# Patient Record
Sex: FEMALE | Race: BLACK OR AFRICAN AMERICAN | Employment: UNEMPLOYED | URBAN - METROPOLITAN AREA
[De-identification: names, ages, dates, MRNs, and addresses within clinical notes are randomized per-mention and may not be internally consistent; named-entity substitution may affect disease eponyms.]

---

## 2018-07-18 ENCOUNTER — OFFICE VISIT (OUTPATIENT)
Dept: FAMILY MEDICINE CLINIC | Age: 63
End: 2018-07-18

## 2018-07-18 ENCOUNTER — HOSPITAL ENCOUNTER (OUTPATIENT)
Dept: LAB | Age: 63
Discharge: HOME | End: 2018-07-18

## 2018-07-18 VITALS
SYSTOLIC BLOOD PRESSURE: 125 MMHG | WEIGHT: 128 LBS | RESPIRATION RATE: 16 BRPM | HEIGHT: 63 IN | BODY MASS INDEX: 22.68 KG/M2 | HEART RATE: 114 BPM | TEMPERATURE: 98.8 F | OXYGEN SATURATION: 96 % | DIASTOLIC BLOOD PRESSURE: 75 MMHG

## 2018-07-18 DIAGNOSIS — E11.9 TYPE 2 DIABETES MELLITUS WITHOUT COMPLICATION, WITHOUT LONG-TERM CURRENT USE OF INSULIN (HCC): ICD-10-CM

## 2018-07-18 DIAGNOSIS — I10 ESSENTIAL HYPERTENSION: ICD-10-CM

## 2018-07-18 DIAGNOSIS — Z76.89 ENCOUNTER TO ESTABLISH CARE: ICD-10-CM

## 2018-07-18 DIAGNOSIS — Z76.89 ENCOUNTER TO ESTABLISH CARE: Primary | ICD-10-CM

## 2018-07-18 LAB
ALBUMIN SERPL-MCNC: 4.1 G/DL (ref 3.4–5)
ALBUMIN/GLOB SERPL: 1.2 {RATIO} (ref 0.8–1.7)
ALP SERPL-CCNC: 79 U/L (ref 45–117)
ALT SERPL-CCNC: 18 U/L (ref 13–56)
AMPHET UR QL SCN: NEGATIVE
ANION GAP SERPL CALC-SCNC: 7 MMOL/L (ref 3–18)
AST SERPL-CCNC: 12 U/L (ref 15–37)
BARBITURATES UR QL SCN: NEGATIVE
BASOPHILS # BLD: 0 K/UL (ref 0–0.1)
BASOPHILS NFR BLD: 0 % (ref 0–2)
BENZODIAZ UR QL: NEGATIVE
BILIRUB SERPL-MCNC: 0.3 MG/DL (ref 0.2–1)
BUN SERPL-MCNC: 28 MG/DL (ref 7–18)
BUN/CREAT SERPL: 25 (ref 12–20)
CALCIUM SERPL-MCNC: 9.6 MG/DL (ref 8.5–10.1)
CANNABINOIDS UR QL SCN: NEGATIVE
CHLORIDE SERPL-SCNC: 98 MMOL/L (ref 100–108)
CHOLEST SERPL-MCNC: 184 MG/DL
CO2 SERPL-SCNC: 32 MMOL/L (ref 21–32)
COCAINE UR QL SCN: NEGATIVE
CREAT SERPL-MCNC: 1.11 MG/DL (ref 0.6–1.3)
DIFFERENTIAL METHOD BLD: ABNORMAL
EOSINOPHIL # BLD: 0.1 K/UL (ref 0–0.4)
EOSINOPHIL NFR BLD: 1 % (ref 0–5)
ERYTHROCYTE [DISTWIDTH] IN BLOOD BY AUTOMATED COUNT: 11.9 % (ref 11.6–14.5)
EST. AVERAGE GLUCOSE BLD GHB EST-MCNC: 298 MG/DL
GLOBULIN SER CALC-MCNC: 3.5 G/DL (ref 2–4)
GLUCOSE SERPL-MCNC: 197 MG/DL (ref 74–99)
HBA1C MFR BLD: 12 % (ref 4.2–5.6)
HCT VFR BLD AUTO: 41.6 % (ref 35–45)
HDLC SERPL-MCNC: 38 MG/DL (ref 40–60)
HDLC SERPL: 4.8 {RATIO} (ref 0–5)
HDSCOM,HDSCOM: NORMAL
HGB BLD-MCNC: 14 G/DL (ref 12–16)
LDLC SERPL CALC-MCNC: 108.8 MG/DL (ref 0–100)
LIPID PROFILE,FLP: ABNORMAL
LYMPHOCYTES # BLD: 1.8 K/UL (ref 0.9–3.6)
LYMPHOCYTES NFR BLD: 27 % (ref 21–52)
MCH RBC QN AUTO: 28.6 PG (ref 24–34)
MCHC RBC AUTO-ENTMCNC: 33.7 G/DL (ref 31–37)
MCV RBC AUTO: 84.9 FL (ref 74–97)
METHADONE UR QL: NEGATIVE
MONOCYTES # BLD: 0.7 K/UL (ref 0.05–1.2)
MONOCYTES NFR BLD: 11 % (ref 3–10)
NEUTS SEG # BLD: 4 K/UL (ref 1.8–8)
NEUTS SEG NFR BLD: 61 % (ref 40–73)
OPIATES UR QL: NEGATIVE
PCP UR QL: NEGATIVE
PLATELET # BLD AUTO: 320 K/UL (ref 135–420)
PMV BLD AUTO: 10.6 FL (ref 9.2–11.8)
POTASSIUM SERPL-SCNC: 3.7 MMOL/L (ref 3.5–5.5)
PROT SERPL-MCNC: 7.6 G/DL (ref 6.4–8.2)
RBC # BLD AUTO: 4.9 M/UL (ref 4.2–5.3)
SODIUM SERPL-SCNC: 137 MMOL/L (ref 136–145)
T PALLIDUM AB SER QL IA: NONREACTIVE
TRIGL SERPL-MCNC: 186 MG/DL (ref ?–150)
TSH SERPL DL<=0.05 MIU/L-ACNC: 1.92 UIU/ML (ref 0.36–3.74)
VLDLC SERPL CALC-MCNC: 37.2 MG/DL
WBC # BLD AUTO: 6.5 K/UL (ref 4.6–13.2)

## 2018-07-18 PROCEDURE — 80053 COMPREHEN METABOLIC PANEL: CPT | Performed by: NURSE PRACTITIONER

## 2018-07-18 PROCEDURE — 84443 ASSAY THYROID STIM HORMONE: CPT | Performed by: NURSE PRACTITIONER

## 2018-07-18 PROCEDURE — 86780 TREPONEMA PALLIDUM: CPT | Performed by: NURSE PRACTITIONER

## 2018-07-18 PROCEDURE — 80061 LIPID PANEL: CPT | Performed by: NURSE PRACTITIONER

## 2018-07-18 PROCEDURE — 85025 COMPLETE CBC W/AUTO DIFF WBC: CPT | Performed by: NURSE PRACTITIONER

## 2018-07-18 PROCEDURE — 80307 DRUG TEST PRSMV CHEM ANLYZR: CPT | Performed by: NURSE PRACTITIONER

## 2018-07-18 PROCEDURE — 87389 HIV-1 AG W/HIV-1&-2 AB AG IA: CPT | Performed by: NURSE PRACTITIONER

## 2018-07-18 PROCEDURE — 80074 ACUTE HEPATITIS PANEL: CPT | Performed by: NURSE PRACTITIONER

## 2018-07-18 PROCEDURE — 86677 HELICOBACTER PYLORI ANTIBODY: CPT | Performed by: NURSE PRACTITIONER

## 2018-07-18 PROCEDURE — 83036 HEMOGLOBIN GLYCOSYLATED A1C: CPT | Performed by: NURSE PRACTITIONER

## 2018-07-18 RX ORDER — METFORMIN HYDROCHLORIDE 500 MG/1
500 TABLET ORAL 2 TIMES DAILY WITH MEALS
Qty: 60 TAB | Refills: 3 | Status: SHIPPED | OUTPATIENT
Start: 2018-07-18 | End: 2018-08-20 | Stop reason: SDUPTHER

## 2018-07-18 RX ORDER — GUAIFENESIN 100 MG/5ML
81 LIQUID (ML) ORAL DAILY
COMMUNITY

## 2018-07-18 RX ORDER — INDAPAMIDE 2.5 MG/1
TABLET, FILM COATED ORAL DAILY
COMMUNITY
End: 2018-07-18 | Stop reason: SDUPTHER

## 2018-07-18 RX ORDER — SPIRONOLACTONE 25 MG/1
25 TABLET ORAL DAILY
Qty: 30 TAB | Refills: 3 | Status: SHIPPED | OUTPATIENT
Start: 2018-07-18 | End: 2018-10-17

## 2018-07-18 RX ORDER — METFORMIN HYDROCHLORIDE 500 MG/1
TABLET ORAL 2 TIMES DAILY WITH MEALS
COMMUNITY
End: 2018-07-18 | Stop reason: SDUPTHER

## 2018-07-18 RX ORDER — SPIRONOLACTONE 25 MG/1
TABLET ORAL DAILY
COMMUNITY
End: 2018-07-18 | Stop reason: SDUPTHER

## 2018-07-18 RX ORDER — INDAPAMIDE 2.5 MG/1
2.5 TABLET, FILM COATED ORAL DAILY
Qty: 30 TAB | Refills: 3 | Status: SHIPPED | OUTPATIENT
Start: 2018-07-18 | End: 2019-01-23 | Stop reason: SDUPTHER

## 2018-07-18 NOTE — PATIENT INSTRUCTIONS
The [facility] [place] reminders! Foundation Operating Hours: These may change without notice. Mon- Wed 7am to 5pm. Closed for lunch 12-1pm  Thurs 7am to 12pm  Fridays closed     NO SHOW POLICY ~ If a patient has 3 no shows for an appointment with the Provider, Mental Health Provider, or the Nurse Navigator in 6 months, they will be discharged from the practice for 6 months. Medication ordering will also be suspended. If the patient is discharged from the [facility], they can go to the [place] [address] where they can be seen for the primary needs plus obtain the same types of medications as they receive at the [facility]. To avoid being discharged, the patient must call the office at [phone] 24 hours prior to their appointment if they need to cancel, arrive to their appointments on time and come to all scheduled appointments. If the patient is discharged from the [facility], they can apply to be re-established after 12 months. Lab work:  Unless you are instructed differently, please return to the office between the hours of 7 am and 10:30 am Monday through Thursday to have your labs drawn one week before your next scheduled PROVIDER visit. If you do not have an appointment to follow up on these results, please make one or plan to call the office if you do not hear from us to get the results. No news does not mean good news. Medications: If your medications are new or have changed, and you get your medications from the clinic pharmacy (C), you MUST talk to the pharmacy staff to sign the new prescription applications. If you don't sign the applications we cannot get the medications for you. It usually takes 6-8 weeks for your medications to arrive. The Pharmacy staff will call you when your medications are available. You will have 30 days to come in and  your medications.  If you don't  your medicines within those 30 days, those medicines will be placed on the self as samples and you will have to start all over again by completing the applications and waiting the 6-8 weeks for your medicines to arrive. This is firm and there will be no exceptions! ! The [facility] Connection or [facility] will assist you with your medications if available but not all medications are available so some may be obtained through local pharmacies such as [facility] that has a large $4 list and [facility] that has many drugs for free or also for $4.  We will work hard to get the best medications for you that you can also afford. [facility] Medication pick-up times:  Monday-Tuesday 1:00 -5:00 PM  Wednesday- Thursday 8:00 -11:30 AM      Feet Care: Local [facility] through [facility]  Every second Tuesday of the month (except for holidays and election days) from 9am to 1 pm. The services provided by these ministry volunteers are free of charge with the option to donate. They will inspect your feet thoroughly, soak them for 10 minutes, cut and file your nails. They care for diabetics as well. Keep in mind this service is free and will be on a first come first serve basis. Bad teeth? Ask about the Dental Bus to get you in front of a local dentist. The bus leaves every other Wednesday for those on the list. (Ask about availability as these appointments are limited)    Eye exams for Diabetics. Please let us know so we can add you to the list to see the eye doctor at [facility]. You will receive a free eye exam and free glasses if needed. Unfortunately, if you are not a diabetic, we do not have a free service for eye exams for you (yet!). We do have information on where to go to get a huge discount on eye exams and glasses. Sick visits: If you are sick and it is not an emergency call the office to see if you can schedule an appointment.      Charges and cost items from the clinic:  Most of our orders are covered by [address] but there ARE SOME CHARGES for items such as radiology interpretations and anesthesiology during procedures and surgeries. Please make sure you have contacted the Advanced Patient Advocacy (APA) group to check on your payment options: [email or web address]. or come in and talk to them in person: On  Tuesdays, we have Advanced Patient Advocacy is available Mon - Fri 8-4pm at [facility] on the first floor by the information desk. Their number is [phone]. It is important that you are screened in order to qualify for assistance and to avoid huge medical charges. The [facility] is not responsible for ANY charges you may accrue regardless of who ordered the medication, procedure, treatment or test. If you go to the Emergency Room, you WILL be charged! Behavior and emotional issues! It is stressful to be sick, have an illness, take medications, not have a job, not have medical insurance, have family issues or just getting older! Schedule an appointment with our mental health provider. She is in the office Mondays and Wednesdays from 8am to [phone] The [facility] can also contact the following: The national suicide hotline (9-038-788-TVDS or 3-950.237.5979)    [address] [facility], [address] [place] [address]    Walk- in hours Monday -Wednesday   8:30 am -11:15 AM  2:15pm -4:15 pm    [employer] (CSB) - [place]  [address], [address]  [phone]        The [place] Department for Aging and The [facility], in collaboration with community partners, provides and advocates for resources and services to improve the employment, quality of life, security, and independence of older [address], [address] with disabilities, and their families. Department for Aging and Rehabilitative Services  [address] Location: [address]   Voice: [address] [place]: [phone]  E-mail: [email or web address]      Immunizations/Vaccination  Routine Immunizations are provided for infants, children, teens, and adults at the [facility] FOR PHYSICAL REHABILITATION. Please bring all immunization records with you and present them at the time of registration. Phone [phone] extension [phone]  Hours (Walk in)  Monday, Tuesday, Wednesday and Friday  8:00 AM to 11:00 AM  12:30 PM to 3:00 PM    Want to Quit Smoking??? Continued tobacco use increases your risks of elevated blood pressure, vascular irritation with increased incidence of cardiovascular with stroke, heart attack, and/or peripheral vascular disease causing claudication. Smoking may also cause lung damage that could lead to COPD, cancer, and death. We encourage you to find a few healthy habits, write them down then plan to decrease your cigarette use by one each week till they are gone all together. Plan for ways to cope with stress for stress may cause you to want to restart. It is imperative to develop new coping mechanisms in order to be successful. 1-800-QUIT-NOW provided for counseling        Drug and Alcohol Addiction Issues! It is hard to stop a poor habit but there is help out there. Please feel free to attend any other the following support groups to help you kick the habit or go to [facility] to be evaluated by the psychiatric team. Never give up!!     AlAnon meetings: Juan Luis mehta, [place], [place]    [place] MONDAY 7:30 PM JUST FOR TODAY AFG Gerard BARCENAS Pentecostalism [place] [address] [address] [facility]     [facility] WEDNESDAY 10:30 AM NEW BEGINNINGS [employer] Gerard Parrott ASSEMBLY OF [facility], ROOM [address] [place] [address] WEDNESDAY 8:00  Kelvin aZpata [address] [facility] [address] 8:00  W [facility] [address] [facility] 8:00 PM LET IT BEGIN WITH ME AFG Gerard CAMPBELL PentecostalismKARIN Mendez 17 6;30 PM [facility] PARENTS AFG Parents [place] Pentecostalism [facility] [facility]      [place] MONDAY 10:30 AM LIFELINE AFG Gerard [facility] 96 [facility] SATURDAY 8:00 PM [facility] SATURDAY NIGHT AFG Gerard [facility] 96 [facility] MONDAY 7:00 PM MONDAY NIGHT AFG Al-Hayley ZHANG [facility] 1589 [facility] WEDNESDAY 8:00 PM SERENITY SEEKERS AFG Al-Hayley [facility] 202 [facility]

## 2018-07-18 NOTE — MR AVS SNAPSHOT
[address] [facility] [address] 
[phone] Patient: Yrn Whatley MRN: [id number] :1955 Visit Information Date & Time Provider Department Dept. Phone Encounter #  
 2018  1:30 PM Roro Davenport NP  [facility] [id number] Your Appointments 2018 10:00 AM  
CONSULT with NURSE NAVIGATOR [address] [place] [address] ([facility]) Appt Note: NPO/LAB REVIEW 1 hour [address] [facility] [phone]  
129 [facility] [address]  
  
    
 10/10/2018  9:00 AM  
PAP/PELVIC with Roro Davenport NP [address] [place] [address] ([facility]) Appt Note: Pap w/labs [address] [facility] [phone]  
129 [facility] [address]  
  
    
 10/17/2018 11:00 AM  
Follow Up with Roro Davenport NP [address] [place] [address] ([facility]) Appt Note: 3 mth follow up  
 [address] [facility] [id number]  
1222 [facility] [address] Upcoming Health Maintenance Date Due Hepatitis C Screening 1955 DTaP/Tdap/Td series (1 - Tdap) 10/8/1975 FOBT Q 1 YEAR AGE 50-75 10/8/2004 ZOSTER VACCINE AGE 60> 2014 PAP AKA CERVICAL CYTOLOGY 2015 Influenza Age 5 to Adult 2018 BREAST CANCER SCRN MAMMOGRAM 2020 Allergies as of 2018  Review Complete On: 2018 By: Roro Davenport NP Severity Noted Reaction Type Reactions Claritin [Loratadine]  2018    Rash Lisinopril  2018    Rash Vistaril [Hydroxyzine Pamoate]  2018    Rash Current Immunizations  Never Reviewed No immunizations on file. Not reviewed this visit You Were Diagnosed With   
  
 Codes Comments Encounter to establish care    -  Primary ICD-10-CM: Z76.89 
ICD-9-CM: V65.8 Essential hypertension     ICD-10-CM: I10 
ICD-9-CM: 401.9 Type 2 diabetes mellitus without complication, without long-term current use of insulin (HCC)     ICD-10-CM: E11.9 ICD-9-CM: 250.00 Vitals BP Pulse Temp Resp Height(growth percentile) Weight(growth percentile) 125/75 (BP 1 Location: Left arm, BP Patient Position: Sitting) (!) 114 98.8 °F (37.1 °C) (Oral) 16 5' 2.5\" (1.588 m) 128 lb (58.1 kg) LMP SpO2 BMI OB Status Smoking Status 01/01/1980 96% 23.04 kg/m2 Hysterectomy Former Smoker Vitals History BMI and BSA Data Body Mass Index Body Surface Area 23.04 kg/m 2 1.6 m 2 Preferred Pharmacy Pharmacy Name Phone Jaime Brown [address] [place] [address]. [phone] Your Updated Medication List  
  
   
This list is accurate as of 7/18/18  2:07 PM.  Always use your most recent med list.  
  
  
  
  
 ALMAZ CHEWABLE ASPIRIN 81 mg chewable tablet Generic drug:  aspirin Take 81 mg by mouth daily. HYDROcodone-acetaminophen 5-500 mg per tablet Commonly known as:  Sueepaulino Lymanple Take 1 Tab by mouth every eight (8) hours as needed (BREAKTHROUGH PAIN). indapamide 2.5 mg tablet Commonly known as:  Leta Alar Take 1 Tab by mouth daily. metFORMIN 500 mg tablet Commonly known as:  GLUCOPHAGE Take 1 Tab by mouth two (2) times daily (with meals). Indications: type 2 diabetes mellitus  
  
 spironolactone 25 mg tablet Commonly known as:  ALDACTONE Take 1 Tab by mouth daily. Prescriptions Sent to Pharmacy Refills  
 spironolactone (ALDACTONE) 25 mg tablet 3 Sig: Take 1 Tab by mouth daily. Class: Normal  
 Pharmacy: [facility] [address] [place] [address]. Ph #: [phone]  Route: Oral  
 indapamide (LOZOL) 2.5 mg tablet 3  
 Sig: Take 1 Tab by mouth daily. Class: Normal  
 Pharmacy: [facility] [address]. Ph #: [phone] Route: Oral  
 metFORMIN (GLUCOPHAGE) 500 mg tablet 3 Sig: Take 1 Tab by mouth two (2) times daily (with meals). Indications: type 2 diabetes mellitus Class: Normal  
 Pharmacy: [facility] [address]. Ph #: [phone] Route: Oral  
  
We Performed the Following REFERRAL TO OPTOMETRY [id number] Custom] Comments:  
 [place] [address] Program  
  
Referral Information Referral ID Referred By Referred To  
  
 [id number] Reinier LINDQUIST Not Available Visits Status Start Date End Date 1 New Request 7/18/18 7/18/19 If your referral has a status of pending review or denied, additional information will be sent to support the outcome of this decision. Patient Instructions The [facility] [place] reminders! Foundation Operating Hours: These may change without notice. Mon- Wed 7am to 5pm. Closed for lunch 12-1pm 
Thurs 7am to 12pm 
Fridays closed NO SHOW POLICY ~ If a patient has 3 no shows for an appointment with the Provider, Mental Health Provider, or the Nurse Navigator in 6 months, they will be discharged from the practice for 6 months. Medication ordering will also be suspended. If the patient is discharged from the [facility], they can go to the [place] [address] where they can be seen for the primary needs plus obtain the same types of medications as they receive at the [facility]. To avoid being discharged, the patient must call the office at [phone] 24 hours prior to their appointment if they need to cancel, arrive to their appointments on time and come to all scheduled appointments. If the patient is discharged from the practice, they can apply to be re-established after 12 months.   
 
Lab work:  Unless you are instructed differently, please return to the office between the hours of 7 am and 10:30 am Monday through Thursday to have your labs drawn one week before your next scheduled PROVIDER visit. If you do not have an appointment to follow up on these results, please make one or plan to call the office if you do not hear from us to get the results. No news does not mean good news. Medications: If your medications are new or have changed, and you get your medications from the clinic pharmacy ([facility]), you MUST talk to the pharmacy staff to sign the new prescription applications. If you don't sign the applications we cannot get the medications for you. It usually takes 6-8 weeks for your medications to arrive. The Pharmacy staff will call you when your medications are available. You will have 30 days to come in and  your medications. If you don't  your medicines within those 30 days, those medicines will be placed on the self as samples and you will have to start all over again by completing the applications and waiting the 6-8 weeks for your medicines to arrive. This is firm and there will be no exceptions! ! The [facility] Connection or [facility] will assist you with your medications if available but not all medications are available so some may be obtained through local pharmacies such as [facility] that has a large $4 list and [facility] that has many drugs for free or also for $4.  We will work hard to get the best medications for you that you can also afford. [facility] Medication pick-up times: 
Monday-Tuesday 1:00 -5:00 PM 
Wednesday- Thursday 8:00 -11:30 AM 
 
 
Feet Care: Local ministry through [facility] Every second Tuesday of the month (except for holidays and election days) from 9am to 1 pm. The services provided by these ministry volunteers are free of charge with the option to donate. They will inspect your feet thoroughly, soak them for 10 minutes, cut and file your nails.  They care for diabetics as well. Keep in mind this service is free and will be on a first come first serve basis. Bad teeth? Ask about the Dental Bus to get you in front of a local dentist. The bus leaves every other Wednesday for those on the list. (Ask about availability as these appointments are limited) Eye exams for Diabetics. Please let us know so we can add you to the list to see the eye doctor at [facility]. You will receive a free eye exam and free glasses if needed. Unfortunately, if you are not a diabetic, we do not have a free service for eye exams for you (yet!). We do have information on where to go to get a huge discount on eye exams and glasses. Sick visits: If you are sick and it is not an emergency call the office to see if you can schedule an appointment. Charges and cost items from the clinic:  Most of our orders are covered by [facility] but there ARE SOME CHARGES for items such as radiology interpretations and anesthesiology during procedures and surgeries. Please make sure you have contacted the Advanced Patient Advocacy (APA) group to check on your payment options: [email or web address]. or come in and talk to them in person: On 
Tuesdays, we have Advanced Patient Advocacy is available Mon - Fri 8-4pm at [facility] on the first floor by the information desk. Their number is [phone]. It is important that you are screened in order to qualify for assistance and to avoid huge medical charges. The [facility] is not responsible for ANY charges you may accrue regardless of who ordered the medication, procedure, treatment or test. If you go to the Emergency Room, you WILL be charged! Behavior and emotional issues! It is stressful to be sick, have an illness, take medications, not have a job, not have medical insurance, have family issues or just getting older! Schedule an appointment with our mental health provider.  She is in the office Mondays and Wednesdays from 8am to 4pm.   
 
You can also contact the following: The national suicide hotline (4-500-610-RHOC or 8-262.414.7383) [facility] [address] [facility], 85 [facility] Walk- in hours Monday -Wednesday 8:30 am -11:15 AM 
2:15pm -4:15 pm 
 
[employer] (CSB) - [place] 14[address] [facility], [address] 
[phone] The [address] [facility] for Aging and The [facility], in collaboration with community partners, provides and advocates for resources and services to improve the employment, quality of life, security, and independence of older [address], [address] with disabilities, and their families. [facility] Street Location: [address] [facility] [place] Voice: [phone] Toll Free Number:[phone] Toll Free TTY: [phone] E-mail: [email or web address]. [email or web address] Immunizations/Vaccination Routine Immunizations are provided for infants, children, teens, and adults at the [facility] FOR PHYSICAL REHABILITATION. Please bring all immunization records with you and present them at the time of registration. Phone [phone] Hours (Walk in) Monday, Tuesday, Wednesday and Friday 8:00 AM to 11:00 AM 
12:30 PM to 3:00 PM 
 
Want to Quit Smoking??? Continued tobacco use increases your risks of elevated blood pressure, vascular irritation with increased incidence of cardiovascular with stroke, heart attack, and/or peripheral vascular disease causing claudication. Smoking may also cause lung damage that could lead to COPD, cancer, and death. We encourage you to find a few healthy habits, write them down then plan to decrease your cigarette use by one each week till they are gone all together. Plan for ways to cope with stress for stress may cause you to want to restart. It is imperative to develop new coping mechanisms in order to be successful. 1-800-QUIT-NOW provided for counseling Drug and Alcohol Addiction Issues! It is hard to stop a poor habit but there is help out there. Please feel free to attend any other the following support groups to help you kick the habit or go to [facility] to be evaluated by the psychiatric team. Never give up!! Michaela meetings: Jemma Hoyt, Rashawn wadsworth [facility] MONDAY 7:30 PM JUST FOR TODAY AFG Al-Anon [facility] [address] [facility] WEDNESDAY 10:30 AM NEW BEGINNINGS AFG Al-Anon [place] ASSEMBLY OF [facility], ROOM 201 [address] [facility]  
 
[facility] WEDNESDAY 8:00  Kelvin Meeks Timmy [address] [facility] StreetSA[facility] Hospital THURSDAY 8:00 PM KEEP IT SIMPLE AFG Al-Anon [facility] [address] 8:00 PM LET IT BEGIN WITH ME AFG Al-Anon [facility] 43[address] [facility] FRIDAY 6;30 PM [place] PARENTS AFG Parents [facility]  [place] MONDAY 10:30  [address] [place] [address] [place] SATURDAY 8:00 PM [facility] SATURDAY NIGHT AFG Al-Anon [place] [address] [place] Onia [place] MONDAY 7:00 PM MONDAY NIGHT AFG Al-Anon ZHANG [facility] [address]  
 
[place] WEDNESDAY 8:00 PM SERENITY SEEKERS AFG Al-Anon [facility] [address] [facility]! [facility] introduces Sohalo patient portal. Now you can access parts of your medical record, email your doctor's office, and request medication refills online. 1. In your internet browser, go to [email or web address] 2. Click on the First Time User? Click Here link in the Sign In box. You will see the New Member Sign Up page. 3. Enter your Sohalo Access Code exactly as it appears below.  You will not need to use this code after youve completed the sign-up process. If you do not sign up before the expiration date, you must request a new code. · [employer] Access Code: [email or web address] Expires: 10/16/2018  2:07 PM 
 
4. Enter the last four digits of your Social Security Number (xxxx) and Date of Birth (mm/dd/yyyy) as indicated and click Submit. You will be taken to the next sign-up page. 5. Create a [employer] ID. This will be your [employer] login ID and cannot be changed, so think of one that is secure and easy to remember. 6. Create a [employer] password. You can change your password at any time. 7. Enter your Password Reset Question and Answer. This can be used at a later time if you forget your password. 8. Enter your e-mail address. You will receive e-mail notification when new information is available in [address] 9. Click Sign Up. You can now view and download portions of your medical record. 10. Click the Download Summary menu link to download a portable copy of your medical information. If you have questions, please visit the Frequently Asked Questions section of the [employer] website. Remember, [employer] is NOT to be used for urgent needs. For medical emergencies, dial 911. Now available from your iPhone and Android! Please provide this summary of care documentation to your next provider. Your primary care clinician is listed as Migue Lange. If you have any questions after today's visit, please call [phone].

## 2018-07-18 NOTE — PROGRESS NOTES
[facility]  [id number] [facility] [address] [facility], [address]  [phone] office/[phone] fax      7/18/2018    Reason for visit:   Chief Complaint   Patient presents with   [address] [place] [address] Establish Care    Medication Refill    Hypertension    Gestational Diabetes       Patient: Ledy Kruse, 1955, [id number]       Primary MD: Vinny Escobedo NP    Subjective: Ledy Kruse, a 60 y.o. female, who is here to establish care and management for chronic conditions. HPI  Diabetes/HTN  Pt with longstanding History of DM and HTN She is currently maintained on Current medications for 10 + years. BP today is at goal today <130/80. Patients risk factors include: Uninsured and limited income  Home Blood Sugars are in the following range:  Not performed   . Denies hypoglycemic episodes  Recent hospitalizations: No   Medications regimen is as follows: Metformin, Lozol, and Spirinolactone  Last HgbA1C:  Unknown   Daily exercise and diet are as follows: Pt walks frequently, not following a routine DM diet, she has not had any formal Dm education. Denies Weight gain or loss   Takes the below meds regularly with no side effects. Taking daily ASA 81 mg. Patient is not on a statin  Last LDL: unknown  Last DM eye exam: unknown  Last DM foot exam: unknown   Last urine microalbumin: Unknown   She is not on an Ace /Arb: she is allergic to Lisinopril.          PHQ over the last two weeks 7/18/2018   Little interest or pleasure in doing things Not at all   Feeling down, depressed, irritable, or hopeless Not at all   Total Score PHQ 2 0       Past Medical History:   Diagnosis Date    Arthritis     Diabetes ([address]     Hypertension        Past Surgical History:   Procedure Laterality Date    HX HYSTERECTOMY  1980    HX TONSILLECTOMY      as a child       Social History     Social History    Marital status:      Spouse name: N/A    Number of children: 1    Years of education: 15 Occupational History    Umemployed      Social History Main Topics    Smoking status: Former Smoker    Smokeless tobacco: Never Used    Alcohol use Yes      Comment: occasionally on holidays    Drug use: No    Sexual activity: Yes     Partners: Male     Birth control/ protection: Condom     Other Topics Concern     Service No    Blood Transfusions No    Caffeine Concern No    Occupational Exposure No    Hobby Hazards No    Sleep Concern Yes    Stress Concern No    Weight Concern No     is losing weight    Special Diet No    Back Care No    Exercise Yes     walks    Bike Helmet No    Seat Belt Yes    Self-Exams Yes     Social History Narrative    Patient lives alone, no pets. Allergies   Allergen Reactions    Claritin [Loratadine] Rash    Lisinopril Rash    Vistaril [Hydroxyzine Pamoate] Rash       Current Outpatient Prescriptions on File Prior to Visit   Medication Sig Dispense Refill    HYDROcodone-acetaminophen (VICODIN) 5-500 mg per tablet Take 1 Tab by mouth every eight (8) hours as needed (BREAKTHROUGH PAIN). 6 Tab 0     No current facility-administered medications on file prior to visit. Review of Systems   Constitutional: Negative. HENT: Negative. Eyes: Positive for blurred vision. Respiratory: Negative. Cardiovascular: Negative. Gastrointestinal: Negative. Genitourinary: Negative. Musculoskeletal: Negative. Skin: Negative. Neurological: Positive for tingling (Minimal in feet). Endo/Heme/Allergies: Negative. Psychiatric/Behavioral: Negative.         Objective:   Visit Vitals    /75 (BP 1 Location: Left arm, BP Patient Position: Sitting)    Pulse (!) 114    Temp 98.8 °F (37.1 °C) (Oral)    Resp 16    Ht 5' 2.5\" (1.588 m)    Wt 128 lb (58.1 kg)    LMP 01/01/1980    SpO2 96%    BMI 23.04 kg/m2      Wt Readings from Last 3 Encounters:   07/18/18 128 lb (58.1 kg)     Lab Results   Component Value Date/Time    Glucose 104 (H) 09/17/2013 11:48 AM       Physical Exam   Constitutional: She is oriented to person, place, and time. She appears well-developed and well-nourished. HENT:   Head: Normocephalic. Eyes: Pupils are equal, round, and reactive to light. Neck: Normal range of motion. Neck supple. No JVD present. Carotid bruit is not present. Cardiovascular: Normal rate, regular rhythm, normal heart sounds and intact distal pulses. No murmur heard. Pulmonary/Chest: Effort normal and breath sounds normal. No respiratory distress. Abdominal: Soft. Bowel sounds are normal.   Musculoskeletal: Normal range of motion. Neurological: She is alert and oriented to person, place, and time. She has normal reflexes. Skin: Skin is warm and dry. Psychiatric: She has a normal mood and affect. Her behavior is normal.   Vitals reviewed. Assessment:    Waldo Led who has risk factors including (see above previous medical hx) and:       ICD-10-CM ICD-9-CM    1. Encounter to establish care Z76.89 V65.8 TSH 3RD GENERATION      DRUG SCREEN, URINE      LIPID PANEL      HEPATITIS PANEL, ACUTE      CBC WITH AUTOMATED DIFF      METABOLIC PANEL, COMPREHENSIVE      HIV 1/2 AG/AB, 4TH GENERATION,W RFLX CONFIRM      HEMOGLOBIN A1C WITH EAG      T PALLIDUM AB      H PYLORI AB, IGG, QT   2. Essential hypertension I10 401.9 spironolactone (ALDACTONE) 25 mg tablet      indapamide (LOZOL) 2.5 mg tablet   3. Type 2 diabetes mellitus without complication, without long-term current use of insulin (HCC) E11.9 250.00 metFORMIN (GLUCOPHAGE) 500 mg tablet      REFERRAL TO OPTOMETRY      MICROALBUMIN, UR, RAND W/ MICROALB/CREAT RATIO       DIABETES FOOT EXAM     - Pt meds continue per previous provider as she has been on current meds for years. Confirmed concomitant use with Pharmacy as well, we will get baseline labs to evaluate.    - Of concern the patient is on two diuretics, if kidney labs not stable will consider switching one to an ARB or CCB as she is allergic to an Ace. Plan:   Reviewed medication and completed the medication reconciliation with the patient. Reviewed side effects of medications with the patient. Questions were answered and patient verb understanding. Pt is a 59 yo AA female. See Med  for details. Pt in the office today to establish care, medication reconciliation . Labs obtained to establish baseline, evaluate metabolic health, nutritional status, vitamin deficiencies and screening for at risk items based on the demographics of the patient, previous medical history and current social practices.  Will contact the patient in when all labs are resulted by phone to review and make lifestyle and medication recommendations. Follow up labs will be completed to monitor improvement prior to their next visit.       Orders Placed This Encounter    TSH 3RD GENERATION     Standing Status:   Future     Standing Expiration Date:   1/17/2019    DRUG SCREEN, URINE     Standing Status:   Future     Standing Expiration Date:   1/17/2019    LIPID PANEL     Standing Status:   Future     Standing Expiration Date:   7/19/2019    HEPATITIS PANEL, ACUTE     Standing Status:   Future     Standing Expiration Date:   1/17/2019    CBC WITH AUTOMATED DIFF     Standing Status:   Future     Standing Expiration Date:   23/91/0685    METABOLIC PANEL, COMPREHENSIVE     Standing Status:   Future     Standing Expiration Date:   1/17/2019    HIV 1/2 AG/AB, 4TH GENERATION,W RFLX CONFIRM     Standing Status:   Future     Standing Expiration Date:   1/17/2019    HEMOGLOBIN A1C WITH EAG     Standing Status:   Future     Standing Expiration Date:   7/19/2019    T PALLIDUM AB     Standing Status:   Future     Standing Expiration Date:   1/18/2019    H PYLORI AB, IGG, QT     Standing Status:   Future     Standing Expiration Date:   1/17/2019    MICROALBUMIN, UR, RAND W/ MICROALB/CREAT RATIO     Standing Status:   Future     Standing Expiration Date: 1/18/2019    REFERRAL TO OPTOMETRY     Referral Priority:   Routine     Referral Type:   Consultation     Referral Reason:   Specialty Services Required    HM DIABETES FOOT EXAM    aspirin (ALMAZ CHEWABLE ASPIRIN) 81 mg chewable tablet     Sig: Take 81 mg by mouth daily.  DISCONTD: metFORMIN (GLUCOPHAGE) 500 mg tablet     Sig: Take  by mouth two (2) times daily (with meals).  DISCONTD: spironolactone (ALDACTONE) 25 mg tablet     Sig: Take  by mouth daily.  DISCONTD: indapamide (LOZOL) 2.5 mg tablet     Sig: Take  by mouth daily.  spironolactone (ALDACTONE) 25 mg tablet     Sig: Take 1 Tab by mouth daily. Dispense:  30 Tab     Refill:  3    indapamide (LOZOL) 2.5 mg tablet     Sig: Take 1 Tab by mouth daily. Dispense:  30 Tab     Refill:  3    metFORMIN (GLUCOPHAGE) 500 mg tablet     Sig: Take 1 Tab by mouth two (2) times daily (with meals). Indications: type 2 diabetes mellitus     Dispense:  60 Tab     Refill:  3     Current Outpatient Prescriptions   Medication Sig Dispense Refill    aspirin (ALMAZ CHEWABLE ASPIRIN) 81 mg chewable tablet Take 81 mg by mouth daily.  spironolactone (ALDACTONE) 25 mg tablet Take 1 Tab by mouth daily. 30 Tab 3    indapamide (LOZOL) 2.5 mg tablet Take 1 Tab by mouth daily. 30 Tab 3    metFORMIN (GLUCOPHAGE) 500 mg tablet Take 1 Tab by mouth two (2) times daily (with meals). Indications: type 2 diabetes mellitus 60 Tab 3    HYDROcodone-acetaminophen (VICODIN) 5-500 mg per tablet Take 1 Tab by mouth every eight (8) hours as needed (BREAKTHROUGH PAIN). 6 Tab 0     Medications Discontinued During This Encounter   Medication Reason    spironolactone (ALDACTONE) 25 mg tablet Reorder    indapamide (LOZOL) 2.5 mg tablet Reorder    metFORMIN (GLUCOPHAGE) 500 mg tablet Reorder       Follow-up Disposition:  Return in about 2 weeks (around 8/1/2018), or if symptoms worsen or fail to improve, for NPO and Lab review.   See [facility] for financial assistance  Labs needed for follow-up appt      Krys Galan, MSN, RN, FNP-C     [facility]    I spent 25 minutes with the patient in face-to-face consultation, of which greater than 50% was spent in counseling and coordination of care as described above.

## 2018-07-19 DIAGNOSIS — E11.9 TYPE 2 DIABETES MELLITUS WITHOUT COMPLICATION, WITHOUT LONG-TERM CURRENT USE OF INSULIN (HCC): Primary | ICD-10-CM

## 2018-07-19 LAB
HAV IGM SER QL: NEGATIVE
HBV CORE IGM SER QL: NEGATIVE
HBV SURFACE AG SER QL: <0.1 INDEX
HBV SURFACE AG SER QL: NEGATIVE
HCV AB SER IA-ACNC: 0.16 INDEX
HCV AB SERPL QL IA: NEGATIVE
HCV COMMENT,HCGAC: NORMAL
HIV 1+2 AB+HIV1 P24 AG SERPL QL IA: NONREACTIVE
HIV12 RESULT COMMENT, HHIVC: NORMAL
SP1: NORMAL
SP2: NORMAL
SP3: NORMAL

## 2018-07-19 RX ORDER — INSULIN GLARGINE 100 [IU]/ML
10 INJECTION, SOLUTION SUBCUTANEOUS
Qty: 5 ADJUSTABLE DOSE PRE-FILLED PEN SYRINGE | Refills: 3 | Status: SHIPPED | COMMUNITY
Start: 2018-07-19 | End: 2018-10-17

## 2018-07-19 RX ORDER — PEN NEEDLE, DIABETIC 30 GX3/16"
NEEDLE, DISPOSABLE MISCELLANEOUS
Qty: 1 PACKAGE | Refills: 11 | Status: SHIPPED | OUTPATIENT
Start: 2018-08-01 | End: 2019-05-22

## 2018-07-19 NOTE — PROGRESS NOTES
Labs reviewed on new patient. Please discuss with patient via phone or appt 8/1  1. A1C 12, metformin not sufficient enough to treat to goal. Will start on low dose lantus daily. The patient is to sign raad with [facility] and  pen needles from [address]

## 2018-07-20 LAB — H PYLORI IGG SER IA-ACNC: <0.8 INDEX VALUE (ref 0–0.79)

## 2018-08-20 ENCOUNTER — OFFICE VISIT (OUTPATIENT)
Dept: FAMILY MEDICINE CLINIC | Age: 63
End: 2018-08-20

## 2018-08-20 DIAGNOSIS — E11.9 TYPE 2 DIABETES MELLITUS WITHOUT COMPLICATION, WITHOUT LONG-TERM CURRENT USE OF INSULIN (HCC): ICD-10-CM

## 2018-08-20 RX ORDER — GLIPIZIDE 5 MG/1
5 TABLET ORAL
Qty: 90 TAB | Refills: 3 | Status: SHIPPED | OUTPATIENT
Start: 2018-08-20 | End: 2018-12-14 | Stop reason: SDUPTHER

## 2018-08-20 RX ORDER — METFORMIN HYDROCHLORIDE 500 MG/1
500 TABLET ORAL
Qty: 60 TAB | Refills: 3 | Status: SHIPPED | OUTPATIENT
Start: 2018-08-20 | End: 2018-11-05 | Stop reason: SDUPTHER

## 2018-08-20 NOTE — PROGRESS NOTES
Patient does not wish to go on insulin at this time. Will start Metformin TID and add Glipizide TID.

## 2018-08-20 NOTE — PROGRESS NOTES
Fredis Jiménez is a 60 y.o. female is here for her initial visit with the Nurse Navigator for orientation appointment to learn on how the [facility] works and the services we can provide. We have reviewed [facility]:   Hours of operation and number to contact us. Inclement weather policy     No Show Policy     IF YOU ARE TAKING MEDICINE FOR HIGH BLOOD PRESSURE~ PLEASE TAKE  YOUR 2 HOURS PRIOR TO ANY OFFICE VISIT TO SEE YOUR PROVIDER OR THE   NURSES. ~~~PLEASE BRING ALL MEDICATIONS YOU ARE TAKING TO YOUR VISIT WITH YOUR PROVIDER OR NURSE NAVIGATOR~~~     Lab work (Hours to have lab work drawn). She was given the dates to RTO for lab draw prior to next appt w/ Provider. Medication Policies including times to  med's, number to dial to reach your pharmacy technician and the paperwork that must be signed to obtain med's. Foot care thru the [facility] foot ministry hours as well as directions     Dental Clinic      Diabetic eye exams     Sick visits     [facility] Program including location at [address] [place] [address] and phone contact number. Enc to contact Med Assist today! PLEASE NOTE THE [facility] CONTACT IS AT THE [facility] TUESDAY MORNINGS FROM 8 AM-1130 AM.YOU MUST SEE [facility] BEFORE BEING REFERRED TO A SPECIALIST! Mental Health appointments with Ms. Sary Leary are scheduled on Mondays and Wednesdays. Suicide Hotline Number and how to contact AA/NA meetings for help with addictions      We have reviewed Ms. Pope lab work today. Ms. Vicenta Altamirano A1C is 12 and does not want to start insulin therapy. She requests I ask Ms. JACKSON if she can take a \"longer acting\" pill. Ms. JACKSON consulted and agrees to start pt on Glipizide and Metformin TID each. She agrees to come in for one hour diet class next week.

## 2018-09-04 ENCOUNTER — OFFICE VISIT (OUTPATIENT)
Dept: FAMILY MEDICINE CLINIC | Age: 63
End: 2018-09-04

## 2018-09-04 DIAGNOSIS — Z71.89 DIABETES EDUCATION, ENCOUNTER FOR: Primary | ICD-10-CM

## 2018-09-04 NOTE — PROGRESS NOTES
Annmarie Galeazzi is a 60 y.o. female is here for NN visit to review Week one and discuss week 2 of the [facility] Diabetic Pathway. Ms. Jessica Del Valle brought  the last 2 weeks meal logs in for review. There are no documented BG readings. Patient stated she was having problems with the meter and getting \"error\" frequently. She demonstrates correct use of the meter and her BG this am is 156. She was provided with a new meter and strips as well as lancing device. Annmarie Galeazzi was started on a 2200 calorie ADA diet by verbal order Ms. Nell Collazo NP. We have reviewed the S/S of hyper/hypoglycemia, the food pyramid, Food choices for meal planning , Food choices to AVOID when planning meals and the measuring and estimating of portions. The patient is strongly encouraged to measure their food choices for the first couple of times so they know correct serving sizes. Ms. Jessica Del Valle is asked to complete the next weeks meal log again, this time using the tools taught today. Time was provided for discussion and any questions. Ms. Jessica Del Valle is encouraged to call this nurse for any questions/concerns and/or write down any questions so we can review on the next visit.

## 2018-09-10 ENCOUNTER — OFFICE VISIT (OUTPATIENT)
Dept: FAMILY MEDICINE CLINIC | Age: 63
End: 2018-09-10

## 2018-09-10 DIAGNOSIS — Z71.89 DIABETES EDUCATION, ENCOUNTER FOR: Primary | ICD-10-CM

## 2018-09-10 NOTE — PROGRESS NOTES
Dominique Monk is a 60 y.o. female here for Week 3 of the [facility] Diabetic Pathway teaching module. Time is provided at the beginning of the session to review last weeks lesson on diet, exchanges, and measuring portions. The meal log was presented for review and will be scanned into record. Ms. Gilbert Estrada was having a problem using her meter today and came in one day early. Meter problem addressed and pt aware what to do in future when she encounters same problem. We have reviewed her meal log and questions about what ADA servings are addressed. Today's session included education of Ms. Quinones's medications. She is taking 10 units of lantus insulin each pm. She is reminded to eat an HS snack. We have reviewed the type on insulin, onset and peak times. Ms. Gilbert Estrada had 2 episodes of BG readings in the 60's ( 64 and 67 before dinner). Ms. Gilbert Estrada made sure to have piece of candy and then have her meal.    The exercise module was reviewed at length. We have discussed low impact exercises and the importance of starting slow and working up to 30 minutes 4 times weekly. Do something daily! I have also strongly stressed no exercise if blood glucose > 240 mg/dcl. due to possible vision changes. The importance of staying hydrated discussed. Pt is aware of the need to drink 8-10 glasses of water every day. Tea and soda is not counted. Time has been provided for questions and patient is encouraged to call for any questions/concerns.

## 2018-09-17 ENCOUNTER — OFFICE VISIT (OUTPATIENT)
Dept: FAMILY MEDICINE CLINIC | Age: 63
End: 2018-09-17

## 2018-09-17 VITALS
TEMPERATURE: 98.5 F | SYSTOLIC BLOOD PRESSURE: 109 MMHG | RESPIRATION RATE: 12 BRPM | HEART RATE: 114 BPM | OXYGEN SATURATION: 98 % | DIASTOLIC BLOOD PRESSURE: 69 MMHG

## 2018-09-17 DIAGNOSIS — R00.0 TACHYCARDIA WITH HEART RATE 100-120 BEATS PER MINUTE: ICD-10-CM

## 2018-09-17 DIAGNOSIS — Z71.89 DIABETES EDUCATION, ENCOUNTER FOR: Primary | ICD-10-CM

## 2018-09-17 NOTE — PROGRESS NOTES
Sid Carver is a 60 y.o. female is here for week 4 of the [facility] diabetic pathway teaching module. MsShannon Banerjee did not keep a meal log but did bring in meter for review. Her 14 day average reading is 99 with FBS ranging from 66 to 129. \"I did get shaky once and I made sure to eat right away, then I felt better\". We have reviewed the disease process and the how and why long term complications occur. Hyper/hypoglycemia  S/S, treatment, prevention, interventions and goals discussed as were MI, CVA, renal and vascular disease. We have reviewed when to call 911 in event of TIA,CVA and MI. Patient taught time is brain/heart muscle and early treatment starting with EMS will save heart muscle and brain function when treated early. BE FAST taught with dire importance of noting onset of symptoms and reporting to closest ED within three hours. Diabetic complications were discussed, including:  Retinopathy: This is a condition where problems with the retina of the eyes develop secondary to persistently elevated blood sugars. In its most severe form it may cause permanent blindness. Early detection is crucial.     Nephropathy: This is a condition where the diabetes adversely effects the kidneys, and eventually can cause renal failure, the need for dialysis, or even death.      Vascular disease: Increased risks of atherosclerosis, heart disease, and stroke also occur with uncontrolled blood sugar. Elevated cholesterol levels and smoking dramatically increase the risk of atherosclerosis in diabetics. Care of the feet was reviewed at length. Sick day protocol management discussed, oral hydration/carbohydrate replacement reviewed with imp. of notifying office of fever > 101 and inability to retain any fluids and/or medications.       Ms. segal was receptive to teaching; asking appropriate questions; exhibited readiness to learn about diabetes disease process and a desire to manage.  Miss Mihir Vazquez will return in one week for continued education and review.      All concerns were addressed and questions answered.

## 2018-09-27 ENCOUNTER — HOSPITAL ENCOUNTER (OUTPATIENT)
Dept: LAB | Age: 63
Discharge: HOME | End: 2018-09-27

## 2018-09-27 ENCOUNTER — OFFICE VISIT (OUTPATIENT)
Dept: FAMILY MEDICINE CLINIC | Age: 63
End: 2018-09-27

## 2018-09-27 VITALS
HEART RATE: 108 BPM | SYSTOLIC BLOOD PRESSURE: 102 MMHG | RESPIRATION RATE: 16 BRPM | DIASTOLIC BLOOD PRESSURE: 63 MMHG | OXYGEN SATURATION: 98 %

## 2018-09-27 DIAGNOSIS — E11.9 TYPE 2 DIABETES MELLITUS WITHOUT COMPLICATION, WITHOUT LONG-TERM CURRENT USE OF INSULIN (HCC): ICD-10-CM

## 2018-09-27 DIAGNOSIS — E11.9 TYPE 2 DIABETES MELLITUS WITHOUT COMPLICATION, WITHOUT LONG-TERM CURRENT USE OF INSULIN (HCC): Primary | ICD-10-CM

## 2018-09-27 DIAGNOSIS — I10 ESSENTIAL HYPERTENSION: ICD-10-CM

## 2018-09-27 LAB
ALBUMIN SERPL-MCNC: 4 G/DL (ref 3.4–5)
ALBUMIN/GLOB SERPL: 1.2 {RATIO} (ref 0.8–1.7)
ALP SERPL-CCNC: 59 U/L (ref 45–117)
ALT SERPL-CCNC: 17 U/L (ref 13–56)
ANION GAP SERPL CALC-SCNC: 10 MMOL/L (ref 3–18)
APPEARANCE UR: CLEAR
AST SERPL-CCNC: 16 U/L (ref 15–37)
BACTERIA URNS QL MICRO: ABNORMAL /HPF
BILIRUB SERPL-MCNC: 0.4 MG/DL (ref 0.2–1)
BILIRUB UR QL: NEGATIVE
BUN SERPL-MCNC: 21 MG/DL (ref 7–18)
BUN/CREAT SERPL: 18 (ref 12–20)
CALCIUM SERPL-MCNC: 9.4 MG/DL (ref 8.5–10.1)
CHLORIDE SERPL-SCNC: 99 MMOL/L (ref 100–108)
CO2 SERPL-SCNC: 31 MMOL/L (ref 21–32)
COLOR UR: YELLOW
CREAT SERPL-MCNC: 1.16 MG/DL (ref 0.6–1.3)
CREAT UR-MCNC: 115 MG/DL (ref 30–125)
EPITH CASTS URNS QL MICRO: ABNORMAL /LPF (ref 0–5)
EST. AVERAGE GLUCOSE BLD GHB EST-MCNC: 171 MG/DL
GLOBULIN SER CALC-MCNC: 3.3 G/DL (ref 2–4)
GLUCOSE SERPL-MCNC: 82 MG/DL (ref 74–99)
GLUCOSE UR STRIP.AUTO-MCNC: NEGATIVE MG/DL
HBA1C MFR BLD: 7.6 % (ref 4.2–5.6)
HGB UR QL STRIP: NEGATIVE
KETONES UR QL STRIP.AUTO: NEGATIVE MG/DL
LEUKOCYTE ESTERASE UR QL STRIP.AUTO: ABNORMAL
MICROALBUMIN UR-MCNC: 1.31 MG/DL (ref 0–3)
MICROALBUMIN/CREAT UR-RTO: 11 MG/G (ref 0–30)
NITRITE UR QL STRIP.AUTO: POSITIVE
PH UR STRIP: 5.5 [PH] (ref 5–8)
POTASSIUM SERPL-SCNC: 3.9 MMOL/L (ref 3.5–5.5)
PROT SERPL-MCNC: 7.3 G/DL (ref 6.4–8.2)
PROT UR STRIP-MCNC: NEGATIVE MG/DL
SODIUM SERPL-SCNC: 140 MMOL/L (ref 136–145)
SP GR UR REFRACTOMETRY: 1.02 (ref 1–1.03)
UROBILINOGEN UR QL STRIP.AUTO: 1 EU/DL (ref 0.2–1)
WBC URNS QL MICRO: ABNORMAL /HPF (ref 0–4)

## 2018-09-27 PROCEDURE — 80053 COMPREHEN METABOLIC PANEL: CPT | Performed by: NURSE PRACTITIONER

## 2018-09-27 PROCEDURE — 82043 UR ALBUMIN QUANTITATIVE: CPT | Performed by: NURSE PRACTITIONER

## 2018-09-27 PROCEDURE — 81001 URINALYSIS AUTO W/SCOPE: CPT | Performed by: NURSE PRACTITIONER

## 2018-09-27 PROCEDURE — 83036 HEMOGLOBIN GLYCOSYLATED A1C: CPT | Performed by: NURSE PRACTITIONER

## 2018-09-27 NOTE — PROGRESS NOTES
Per provider verbal order with readback, CMP, HA1C, Urinalysis and UA microalb ordered for 10/17/18 visit appt. Verified patient name, , demographics and orders. Venipuncture for labs performed using 23G x 3/4\" butterfly Left AC using aseptic technique. Skin intact and dry. No active bleeding or complications noted. Bandaid dressing applied.

## 2018-09-27 NOTE — PROGRESS NOTES
Will review results with pt at 10/10/18 appt  1. A1C decrease 7.6 from 12  2. UA positive for nitrates and Leuks, assess if symptomatic

## 2018-09-27 NOTE — PROGRESS NOTES
SUBJECTIVE:  60 y.o. female for follow up of diabetes. Diabetic Review of Systems - medication compliance: compliant all of the time, diabetic diet compliance: compliant most of the time, home glucose monitoring: is performed regularly, fasting values range  with her 14 day average being 96. Other symptoms and concerns: none. Current Outpatient Prescriptions   Medication Sig Dispense Refill    metFORMIN (GLUCOPHAGE) 500 mg tablet Take 1 Tab by mouth three (3) times daily (with meals). Indications: type 2 diabetes mellitus 60 Tab 3    glipiZIDE (GLUCOTROL) 5 mg tablet Take 1 Tab by mouth Before breakfast, lunch, and dinner. 90 Tab 3    insulin glargine (LANTUS,BASAGLAR) 100 unit/mL (3 mL) inpn 10 Units by SubCUTAneous route nightly. 5 Adjustable Dose Pre-filled Pen Syringe 3    aspirin (ALMAZ CHEWABLE ASPIRIN) 81 mg chewable tablet Take 81 mg by mouth daily.  spironolactone (ALDACTONE) 25 mg tablet Take 1 Tab by mouth daily. 30 Tab 3    indapamide (LOZOL) 2.5 mg tablet Take 1 Tab by mouth daily. 30 Tab 3    Insulin Needles, Disposable, 31 gauge x 5/16\" ndle Use as directed to administer insulin 1 Package 11       OBJECTIVE:  Appearance: alert, well appearing, and in no distress, oriented to person, place, and time, normal appearing weight and well hydrated. Visit Vitals    /63 (BP 1 Location: Right arm, BP Patient Position: Sitting)    Pulse (!) 108  Comment: APICAL    Resp 16    LMP 01/01/1980    SpO2 98%       Exam: heart sounds normal rate and regular rhythm, chest clear    ASSESSMENT:  Diabetes Mellitus: stable, improved, asymptomatic    PLAN:  Issues reviewed with her: Pt kept log of HR and log averaged . Log reported to Ms. Mackenzie Lantigua. Pt reports drinking at least 4 bottles of water daily and urine in \"light\" in color. Pt will have CMP, A1C and urine done for upcoming appt w/Provider.

## 2018-10-02 ENCOUNTER — OFFICE VISIT (OUTPATIENT)
Dept: FAMILY MEDICINE CLINIC | Age: 63
End: 2018-10-02

## 2018-10-02 VITALS
DIASTOLIC BLOOD PRESSURE: 66 MMHG | HEART RATE: 107 BPM | SYSTOLIC BLOOD PRESSURE: 108 MMHG | OXYGEN SATURATION: 98 % | RESPIRATION RATE: 18 BRPM

## 2018-10-02 DIAGNOSIS — Z01.30 BLOOD PRESSURE CHECK: Primary | ICD-10-CM

## 2018-10-02 NOTE — PROGRESS NOTES
Pt here as walk in requesting to see Provider. Pt states \" I have been weak, sluggish and I am not sleeping good\". Her AP heart rate has been elevated the last four OV. She feels as the Aldactone is the drug making her feel bad and she requests it be changed. VS taken and reported to Ms. Wyatt Barrios. HR varies from 107-115 at rest.  Pt w/o peripheral edema and denies SOB. Per MsShannon Wyatt Denis she may stop the Aldactone and RTO in a week for recheck. Pt instructed to report to closest ED for any CP,SOB or if worse in any other way, she verbalizes understanding of the same.

## 2018-10-06 ENCOUNTER — HOSPITAL ENCOUNTER (EMERGENCY)
Age: 63
Discharge: HOME | End: 2018-10-06
Attending: EMERGENCY MEDICINE | Admitting: EMERGENCY MEDICINE
Payer: SELF-PAY

## 2018-10-06 ENCOUNTER — APPOINTMENT (OUTPATIENT)
Dept: GENERAL RADIOLOGY | Age: 63
End: 2018-10-06
Attending: EMERGENCY MEDICINE
Payer: SELF-PAY

## 2018-10-06 VITALS
HEART RATE: 94 BPM | OXYGEN SATURATION: 99 % | TEMPERATURE: 99.2 F | RESPIRATION RATE: 18 BRPM | BODY MASS INDEX: 24.94 KG/M2 | WEIGHT: 127 LBS | DIASTOLIC BLOOD PRESSURE: 72 MMHG | SYSTOLIC BLOOD PRESSURE: 115 MMHG | HEIGHT: 60 IN

## 2018-10-06 DIAGNOSIS — R07.9 CHEST PAIN, UNSPECIFIED TYPE: Primary | ICD-10-CM

## 2018-10-06 LAB
ANION GAP SERPL CALC-SCNC: 5 MMOL/L (ref 3–18)
BASOPHILS # BLD: 0 K/UL (ref 0–0.1)
BASOPHILS NFR BLD: 0 % (ref 0–2)
BUN SERPL-MCNC: 16 MG/DL (ref 7–18)
BUN/CREAT SERPL: 19 (ref 12–20)
CALCIUM SERPL-MCNC: 9.5 MG/DL (ref 8.5–10.1)
CHLORIDE SERPL-SCNC: 101 MMOL/L (ref 100–108)
CK MB CFR SERPL CALC: NORMAL % (ref 0–4)
CK MB SERPL-MCNC: <1 NG/ML (ref 5–25)
CK SERPL-CCNC: 119 U/L (ref 26–192)
CO2 SERPL-SCNC: 34 MMOL/L (ref 21–32)
CREAT SERPL-MCNC: 0.83 MG/DL (ref 0.6–1.3)
DIFFERENTIAL METHOD BLD: NORMAL
EOSINOPHIL # BLD: 0.1 K/UL (ref 0–0.4)
EOSINOPHIL NFR BLD: 2 % (ref 0–5)
ERYTHROCYTE [DISTWIDTH] IN BLOOD BY AUTOMATED COUNT: 12.5 % (ref 11.6–14.5)
GLUCOSE SERPL-MCNC: 121 MG/DL (ref 74–99)
HCT VFR BLD AUTO: 37.3 % (ref 35–45)
HGB BLD-MCNC: 12.2 G/DL (ref 12–16)
LYMPHOCYTES # BLD: 1.2 K/UL (ref 0.9–3.6)
LYMPHOCYTES NFR BLD: 25 % (ref 21–52)
MCH RBC QN AUTO: 28.4 PG (ref 24–34)
MCHC RBC AUTO-ENTMCNC: 32.7 G/DL (ref 31–37)
MCV RBC AUTO: 86.9 FL (ref 74–97)
MONOCYTES # BLD: 0.5 K/UL (ref 0.05–1.2)
MONOCYTES NFR BLD: 10 % (ref 3–10)
NEUTS SEG # BLD: 3.1 K/UL (ref 1.8–8)
NEUTS SEG NFR BLD: 63 % (ref 40–73)
PLATELET # BLD AUTO: 316 K/UL (ref 135–420)
PMV BLD AUTO: 10.1 FL (ref 9.2–11.8)
POTASSIUM SERPL-SCNC: 3.8 MMOL/L (ref 3.5–5.5)
RBC # BLD AUTO: 4.29 M/UL (ref 4.2–5.3)
SODIUM SERPL-SCNC: 140 MMOL/L (ref 136–145)
TROPONIN I SERPL-MCNC: <0.02 NG/ML (ref 0–0.04)
WBC # BLD AUTO: 4.9 K/UL (ref 4.6–13.2)

## 2018-10-06 PROCEDURE — 93005 ELECTROCARDIOGRAM TRACING: CPT

## 2018-10-06 PROCEDURE — 74011250637 HC RX REV CODE- 250/637: Performed by: EMERGENCY MEDICINE

## 2018-10-06 PROCEDURE — 99284 EMERGENCY DEPT VISIT MOD MDM: CPT

## 2018-10-06 PROCEDURE — 71045 X-RAY EXAM CHEST 1 VIEW: CPT

## 2018-10-06 PROCEDURE — 80048 BASIC METABOLIC PNL TOTAL CA: CPT | Performed by: EMERGENCY MEDICINE

## 2018-10-06 PROCEDURE — 82550 ASSAY OF CK (CPK): CPT | Performed by: EMERGENCY MEDICINE

## 2018-10-06 PROCEDURE — 85025 COMPLETE CBC W/AUTO DIFF WBC: CPT | Performed by: EMERGENCY MEDICINE

## 2018-10-06 RX ORDER — GUAIFENESIN 100 MG/5ML
324 LIQUID (ML) ORAL
Status: COMPLETED | OUTPATIENT
Start: 2018-10-06 | End: 2018-10-06

## 2018-10-06 RX ADMIN — ASPIRIN 81 MG CHEWABLE TABLET 324 MG: 81 TABLET CHEWABLE at 10:15

## 2018-10-06 NOTE — DISCHARGE INSTRUCTIONS
Chest Pain: Care Instructions  Your Care Instructions    There are many things that can cause chest pain. Some are not serious and will get better on their own in a few days. But some kinds of chest pain need more testing and treatment. Your doctor may have recommended a follow-up visit in the next 8 to 12 hours. If you are not getting better, you may need more tests or treatment. Even though your doctor has released you, you still need to watch for any problems. The doctor carefully checked you, but sometimes problems can develop later. If you have new symptoms or if your symptoms do not get better, get medical care right away. If you have worse or different chest pain or pressure that lasts more than 5 minutes or you passed out (lost consciousness), call 911 or seek other emergency help right away. A medical visit is only one step in your treatment. Even if you feel better, you still need to do what your doctor recommends, such as going to all suggested follow-up appointments and taking medicines exactly as directed. This will help you recover and help prevent future problems. How can you care for yourself at home? · Rest until you feel better. · Take your medicine exactly as prescribed. Call your doctor if you think you are having a problem with your medicine. · Do not drive after taking a prescription pain medicine. When should you call for help? Call 911 if:    · You passed out (lost consciousness).     · You have severe difficulty breathing.     · You have symptoms of a heart attack. These may include:  ¨ Chest pain or pressure, or a strange feeling in your chest.  ¨ Sweating. ¨ Shortness of breath. ¨ Nausea or vomiting. ¨ Pain, pressure, or a strange feeling in your back, neck, jaw, or upper belly or in one or both shoulders or arms. ¨ Lightheadedness or sudden weakness. ¨ A fast or irregular heartbeat.   After you call 911, the  may tell you to chew 1 adult-strength or 2 to 4 low-dose aspirin. Wait for an ambulance. Do not try to drive yourself.    Call your doctor today if:    · You have any trouble breathing.     · Your chest pain gets worse.     · You are dizzy or lightheaded, or you feel like you may faint.     · You are not getting better as expected.     · You are having new or different chest pain. Where can you learn more? Go to http://libia-salas.info/. Enter A120 in the search box to learn more about \"Chest Pain: Care Instructions. \"  Current as of: November 20, 2017  Content Version: 11.8  © 5914-5961 [employer]. Care instructions adapted under license by [employer] (which disclaims liability or warranty for this information). If you have questions about a medical condition or this instruction, always ask your healthcare professional. [address] any warranty or liability for your use of this information.

## 2018-10-06 NOTE — ED PROVIDER NOTES
EMERGENCY DEPARTMENT HISTORY AND PHYSICAL EXAM 
 
8:33 AM 
 
 
Date: 10/6/18 Patient Name: Dale Carver History of Presenting Illness Chief Complaint Patient presents with  Chest Pain History Provided By: Patient Chief Complaint: Chest pain Duration:  3 Hours Timing:  Intermittent Location: Left side of anterior chest 
Quality: Kaylen Skaggs Severity: Moderate Modifying Factors: Not improved by Aspirin taken this morning Associated Symptoms: Patient denies hx of heart attacks, hx of asthma, hx of COPD, and any other associated symptoms or complaints Additional History (Context): Dale Carver is a 60 y.o. female with a past medical history of HTN and DM who presents with c/o chest pain onset 3 hours ago. Patient describes the pain as intermittent, located in the left side of anterior chest, sharp, moderate, and is not improved by Aspirin this morning. She states that she was already awake when this pain occurred. She is not sure whether he had this pain in the past. She states that she is diabetic and that her sugar has been under 100. She takes medications for HTN and DM. Patient does not smoke, she occasionally drinks, and does not take recreational drugs. Patient denies hx of heart attacks, hx of asthma, hx of COPD, and any other associated symptoms or complaints. PCP: Phil Agrawal NP Current Outpatient Prescriptions Medication Sig Dispense Refill  metFORMIN (GLUCOPHAGE) 500 mg tablet Take 1 Tab by mouth three (3) times daily (with meals). Indications: type 2 diabetes mellitus 60 Tab 3  
 glipiZIDE (GLUCOTROL) 5 mg tablet Take 1 Tab by mouth Before breakfast, lunch, and dinner. 90 Tab 3  
 insulin glargine (LANTUS,BASAGLAR) 100 unit/mL (3 mL) inpn 10 Units by SubCUTAneous route nightly.  5 Adjustable Dose Pre-filled Pen Syringe 3  
 Insulin Needles, Disposable, 31 gauge x 5/16\" ndle Use as directed to administer insulin 1 Package 11  
  aspirin (ALMAZ CHEWABLE ASPIRIN) 81 mg chewable tablet Take 81 mg by mouth daily.  spironolactone (ALDACTONE) 25 mg tablet Take 1 Tab by mouth daily. 30 Tab 3  
 indapamide (LOZOL) 2.5 mg tablet Take 1 Tab by mouth daily. 30 Tab 3 Past History Past Medical History: 
Past Medical History:  
Diagnosis Date  Arthritis  Diabetes ([address]  Hypertension Past Surgical History: 
Past Surgical History:  
Procedure Laterality Date [address]  HX TONSILLECTOMY    
 as a child Family History: 
Family History Problem Relation Age of Onset  Breast Cancer Mother  Diabetes Mother  Hypertension Father  Thyroid Cancer Sister  Hypertension Brother  Cancer Maternal Grandmother Social History: 
Social History Substance Use Topics  Smoking status: Former Smoker  Smokeless tobacco: Never Used  Alcohol use Yes Comment: occasionally on holidays Allergies: Allergies Allergen Reactions  Claritin [Loratadine] Rash  Lisinopril Rash  Vistaril [Hydroxyzine Pamoate] Rash Review of Systems Review of Systems Constitutional: Negative. Negative for chills, diaphoresis and fever. HENT: Negative. Negative for congestion, rhinorrhea and sore throat. Eyes: Negative. Negative for pain, discharge and redness. Respiratory: Negative. Negative for cough, chest tightness, shortness of breath and wheezing. Cardiovascular: Positive for chest pain. Gastrointestinal: Negative. Negative for abdominal pain, constipation, diarrhea, nausea and vomiting. Genitourinary: Negative. Negative for dysuria, flank pain, frequency, hematuria and urgency. Musculoskeletal: Negative. Negative for back pain and neck pain. Skin: Negative. Negative for rash. Neurological: Negative. Negative for syncope, weakness, numbness and headaches. Psychiatric/Behavioral: Negative. All other systems reviewed and are negative. Physical Exam  
 
Visit Vitals  /72 (BP 1 Location: Right arm)  Pulse 94  Temp 99.2 °F (37.3 °C)  Resp 18  Ht 5' (1.524 m)  Wt 57.6 kg (127 lb)  LMP 01/01/1980  SpO2 99%  BMI 24.8 kg/m2 Physical Exam  
Constitutional: She is oriented to person, place, and time. She appears well-developed and well-nourished. Non-toxic appearance. She does not have a sickly appearance. She does not appear ill. No distress. HENT:  
Head: Normocephalic and atraumatic. Mouth/Throat: Oropharynx is clear and moist. No oropharyngeal exudate. Eyes: Conjunctivae and EOM are normal. Pupils are equal, round, and reactive to light. No scleral icterus. Neck: Normal range of motion. Neck supple. No hepatojugular reflux and no JVD present. No tracheal deviation present. No thyromegaly present. Cardiovascular: Normal rate, regular rhythm, S1 normal, S2 normal, normal heart sounds, intact distal pulses and normal pulses. Exam reveals no gallop, no S3 and no S4. No murmur heard. Pulses: 
     Radial pulses are 2+ on the right side, and 2+ on the left side. Dorsalis pedis pulses are 2+ on the right side, and 2+ on the left side. Pulmonary/Chest: Effort normal and breath sounds normal. No respiratory distress. She has no decreased breath sounds. She has no wheezes. She has no rhonchi. She has no rales. Abdominal: Soft. Normal appearance and bowel sounds are normal. She exhibits no distension and no mass. There is no hepatosplenomegaly. There is no tenderness. There is no rigidity, no rebound, no guarding, no CVA tenderness, no tenderness at McBurney's point and negative Hamilton's sign. Musculoskeletal: Normal range of motion. She exhibits no edema or tenderness. Strength 5/5 throughout Lymphadenopathy:  
     Head (right side): No submental, no submandibular, no preauricular and no occipital adenopathy present. Head (left side): No submental, no submandibular, no preauricular and no occipital adenopathy present. She has no cervical adenopathy. Right: No supraclavicular adenopathy present. Left: No supraclavicular adenopathy present. Neurological: She is alert and oriented to person, place, and time. She has normal strength and normal reflexes. She is not disoriented. No cranial nerve deficit or sensory deficit. Coordination and gait normal. GCS eye subscore is 4. GCS verbal subscore is 5. GCS motor subscore is 6. Grossly intact Skin: Skin is warm, dry and intact. No rash noted. She is not diaphoretic. Psychiatric: She has a normal mood and affect. Her speech is normal and behavior is normal. Judgment and thought content normal. Cognition and memory are normal.  
Nursing note and vitals reviewed. Diagnostic Study Results Labs - Recent Results (from the past 12 hour(s)) EKG, 12 LEAD, INITIAL Collection Time: 10/06/18  6:06 AM  
Result Value Ref Range Ventricular Rate 93 BPM  
 Atrial Rate 93 BPM  
 P-R Interval 144 ms QRS Duration 76 ms  
 Q-T Interval 352 ms QTC Calculation (Bezet) 437 ms Calculated P Axis 67 degrees Calculated R Axis 8 degrees Calculated T Axis 70 degrees Diagnosis Normal sinus rhythm Normal ECG No previous ECGs available CBC WITH AUTOMATED DIFF Collection Time: 10/06/18  6:16 AM  
Result Value Ref Range WBC 4.9 4.6 - 13.2 K/uL  
 RBC 4.29 4. 20 - 5.30 M/uL  
 HGB 12.2 12.0 - 16.0 g/dL HCT 37.3 35.0 - 45.0 % MCV 86.9 74.0 - 97.0 FL  
 MCH 28.4 24.0 - 34.0 PG  
 MCHC 32.7 31.0 - 37.0 g/dL  
 RDW 12.5 11.6 - 14.5 % PLATELET 623 636 - 322 K/uL MPV 10.1 9.2 - 11.8 FL  
 NEUTROPHILS 63 40 - 73 % LYMPHOCYTES 25 21 - 52 % MONOCYTES 10 3 - 10 % EOSINOPHILS 2 0 - 5 % BASOPHILS 0 0 - 2 %  
 ABS. NEUTROPHILS 3.1 1.8 - 8.0 K/UL  
 ABS. LYMPHOCYTES 1.2 0.9 - 3.6 K/UL  
 ABS. MONOCYTES 0.5 0.05 - 1.2 K/UL ABS. EOSINOPHILS 0.1 0.0 - 0.4 K/UL  
 ABS. BASOPHILS 0.0 0.0 - 0.1 K/UL  
 DF AUTOMATED METABOLIC PANEL, BASIC Collection Time: 10/06/18  6:16 AM  
Result Value Ref Range Sodium 140 136 - 145 mmol/L Potassium 3.8 3.5 - 5.5 mmol/L Chloride 101 100 - 108 mmol/L  
 CO2 34 (H) 21 - 32 mmol/L Anion gap 5 3.0 - 18 mmol/L Glucose 121 (H) 74 - 99 mg/dL BUN 16 7.0 - 18 MG/DL Creatinine 0.83 0.6 - 1.3 MG/DL  
 BUN/Creatinine ratio 19 12 - 20 GFR est AA >60 >60 ml/min/1.73m2 GFR est non-AA >60 >60 ml/min/1.73m2 Calcium 9.5 8.5 - 10.1 MG/DL  
CARDIAC PANEL,(CK, CKMB & TROPONIN) Collection Time: 10/06/18  6:16 AM  
Result Value Ref Range  26 - 192 U/L  
 CK - MB <1.0 <3.6 ng/ml CK-MB Index  0.0 - 4.0 % CALCULATION NOT PERFORMED WHEN RESULT IS BELOW LINEAR LIMIT Troponin-I, Qt. <0.02 0.0 - 0.045 NG/ML Radiologic Studies -  
XR CHEST PORT    (Results Pending) No results found. Medical Decision Making Provider Notes (Medical Decision Making): MDM Number of Diagnoses or Management Options Chest pain, unspecified type:  
Diagnosis management comments: DIFFERENTIAL DIAGNOSES/ MEDICAL DECISION MAKING: 
Chest pain etiologies include acute cardiac events to include possible acute myocardial infarction, acute coronary syndrome, pneumonia, chest wall pain (myofascial/ musculoskeletal etiology), chronic obstructive pulmonary disease (copd), acute asthma exacerbation, congestive heart failure, acute bronchitis, pulmonary embolism, upper respiratory infection, referred abdominal pain, other etiologies, versus combination of the above. 60year old female presents with chest pain. I will do cardiac workup. I have a low suspicion for cardiac at this time. I am the first provider for this patient. I reviewed the vital signs, available nursing notes, past medical history, past surgical history, family history and social history. Vital Signs-Reviewed the patient's vital signs. Records Reviewed: Nursing Notes (Time of Review: 8:33 AM) ED Course: Progress Notes, Reevaluation, and Consults: 
 
8:33 AM 10/6/2018 Labs essentially normal. Cardiac enzymes negative. Chest X-Ray showed no acute process. EKG showed NSR with rate of 93 bpm. With no ST elevations or depression and non specific T wave changes. Diagnosis I have reassessed the patient. Patient is feeling better and continues to be pain free. Patient was discharged in stable condition. Patient is to return to emergency department if any new or worsening condition. Clinical Impression: 1. Chest pain, unspecified type Disposition: discharge Follow-up Information Follow up With Details Comments Contact Info Markie Holman NP Schedule an appointment as soon as possible for a visit in 2 days ED visit follow up [address] [facility] 
[phone] [facility] EMERGENCY DEPT  As needed, If symptoms worsen [address] [facility] 
[phone]  
  
  
 
_______________________________ Attestations: 
Scribe Attestation Sanjeev Joy acting as a scribe for and in the presence of [employer], DO October 06, 2018 at 8:33 AM 
    
Provider Attestation:     
I personally performed the services described in the documentation, reviewed the documentation, as recorded by the scribe in my presence, and it accurately and completely records my words and actions. October 06, 2018 at 8:33 AM - Timbo He, DO 
   
_______________________________

## 2018-10-07 LAB
ATRIAL RATE: 93 BPM
CALCULATED P AXIS, ECG09: 67 DEGREES
CALCULATED R AXIS, ECG10: 8 DEGREES
CALCULATED T AXIS, ECG11: 70 DEGREES
DIAGNOSIS, 93000: NORMAL
P-R INTERVAL, ECG05: 144 MS
Q-T INTERVAL, ECG07: 352 MS
QRS DURATION, ECG06: 76 MS
QTC CALCULATION (BEZET), ECG08: 437 MS
VENTRICULAR RATE, ECG03: 93 BPM

## 2018-10-10 ENCOUNTER — LAB ONLY (OUTPATIENT)
Dept: FAMILY MEDICINE CLINIC | Age: 63
End: 2018-10-10

## 2018-10-17 ENCOUNTER — OFFICE VISIT (OUTPATIENT)
Dept: FAMILY MEDICINE CLINIC | Age: 63
End: 2018-10-17

## 2018-10-17 VITALS
HEART RATE: 114 BPM | DIASTOLIC BLOOD PRESSURE: 69 MMHG | RESPIRATION RATE: 16 BRPM | HEIGHT: 60 IN | TEMPERATURE: 98.2 F | WEIGHT: 126.8 LBS | SYSTOLIC BLOOD PRESSURE: 105 MMHG | BODY MASS INDEX: 24.9 KG/M2 | OXYGEN SATURATION: 97 %

## 2018-10-17 DIAGNOSIS — I10 ESSENTIAL HYPERTENSION: ICD-10-CM

## 2018-10-17 DIAGNOSIS — R00.0 TACHYCARDIA WITH HEART RATE 100-120 BEATS PER MINUTE: ICD-10-CM

## 2018-10-17 DIAGNOSIS — E11.9 TYPE 2 DIABETES MELLITUS WITHOUT COMPLICATION, WITHOUT LONG-TERM CURRENT USE OF INSULIN (HCC): Primary | ICD-10-CM

## 2018-10-17 NOTE — PROGRESS NOTES
Subjective: Cara Duenas is a 58 y.o. female seen for follow up of diabetes. She also has hypertension and hyperlipidemia. Diabetic Review of Systems - medication compliance: compliant all of the time, diabetic diet compliance: Reports she does watch what she eats, may miss a few meals, home glucose monitoring: is performed regularly, further diabetic ROS: no chest pain, dyspnea or TIA's, no numbness, tingling or pain in extremities, has noted excessive thirstiness and frequent urination, weight has decreased, blurry vision, last eye exam 10/18/18. Other symptoms and concerns: Pt reports her new eye glass prescription is not working. reports vision is blurry and she cannot see out of them. Patient Active Problem List  
 Diagnosis Date Noted  Essential hypertension 07/18/2018  Type 2 diabetes mellitus without complication, without long-term current use of insulin ([facility] [address] 07/18/2018 Current Outpatient Medications Medication Sig Dispense Refill  metFORMIN (GLUCOPHAGE) 500 mg tablet Take 1 Tab by mouth three (3) times daily (with meals). Indications: type 2 diabetes mellitus 60 Tab 3  
 glipiZIDE (GLUCOTROL) 5 mg tablet Take 1 Tab by mouth Before breakfast, lunch, and dinner. 90 Tab 3  
 Insulin Needles, Disposable, 31 gauge x 5/16\" ndle Use as directed to administer insulin 1 Package 11  
 aspirin (ALMAZ CHEWABLE ASPIRIN) 81 mg chewable tablet Take 81 mg by mouth daily.  indapamide (LOZOL) 2.5 mg tablet Take 1 Tab by mouth daily. (Patient taking differently: Take 1.25 mg by mouth daily.) 30 Tab 3 Allergies Allergen Reactions  Claritin [Loratadine] Rash  Lisinopril Rash  Vistaril [Hydroxyzine Pamoate] Rash Past Medical History:  
Diagnosis Date  Arthritis  Diabetes ([facility] [address]  Diabetic eye exam ([facility] 10/08/2018 Bilateral cataracts   No retinopathy  Hypertension Past Surgical History:  
Procedure Laterality Date [address] [facility]  HX TONSILLECTOMY    
 as a child Family History Problem Relation Age of Onset  Breast Cancer Mother  Diabetes Mother  Hypertension Father  Thyroid Cancer Sister  Hypertension Brother  Cancer Maternal Grandmother Social History Tobacco Use  Smoking status: Former Smoker  Smokeless tobacco: Never Used Substance Use Topics  Alcohol use: Yes Comment: occasionally on holidays Lab Results Component Value Date/Time WBC 4.9 10/06/2018 06:16 AM  
 HGB 12.2 10/06/2018 06:16 AM  
 HCT 37.3 10/06/2018 06:16 AM  
 PLATELET 172 19/89/3272 06:16 AM  
 MCV 86.9 10/06/2018 06:16 AM  
 
Lab Results Component Value Date/Time Hemoglobin A1c 7.6 (H) 09/27/2018 09:27 AM  
 Hemoglobin A1c 12.0 (H) 07/18/2018 01:08 PM  
 Hemoglobin A1c 7.5 (H) 12/12/2012 09:05 AM  
 Glucose 121 (H) 10/06/2018 06:16 AM  
 Microalbumin/Creat ratio (mg/g creat) 11 09/27/2018 09:27 AM  
 Microalbumin,urine random 1.31 09/27/2018 09:27 AM  
 LDL, calculated 108.8 (H) 07/18/2018 01:08 PM  
 Creatinine 0.83 10/06/2018 06:16 AM  
  
Lab Results Component Value Date/Time Cholesterol, total 184 07/18/2018 01:08 PM  
 HDL Cholesterol 38 (L) 07/18/2018 01:08 PM  
 LDL, calculated 108.8 (H) 07/18/2018 01:08 PM  
 Triglyceride 186 (H) 07/18/2018 01:08 PM  
 CHOL/HDL Ratio 4.8 07/18/2018 01:08 PM  
 
Lab Results Component Value Date/Time  Sodium 140 10/06/2018 06:16 AM  
 Potassium 3.8 10/06/2018 06:16 AM  
 Chloride 101 10/06/2018 06:16 AM  
 CO2 34 (H) 10/06/2018 06:16 AM  
 Anion gap 5 10/06/2018 06:16 AM  
 Glucose 121 (H) 10/06/2018 06:16 AM  
 BUN 16 10/06/2018 06:16 AM  
 Creatinine 0.83 10/06/2018 06:16 AM  
 BUN/Creatinine ratio 19 10/06/2018 06:16 AM  
 GFR est AA >60 10/06/2018 06:16 AM  
 GFR est non-AA >60 10/06/2018 06:16 AM  
 Calcium 9.5 10/06/2018 06:16 AM  
 Bilirubin, total 0.4 09/27/2018 09:27 AM  
 ALT (SGPT) 17 09/27/2018 09:27 AM  
 AST (SGOT) 16 09/27/2018 09:27 AM  
 Alk. phosphatase 59 09/27/2018 09:27 AM  
 Protein, total 7.3 09/27/2018 09:27 AM  
 Albumin 4.0 09/27/2018 09:27 AM  
 Globulin 3.3 09/27/2018 09:27 AM  
 A-G Ratio 1.2 09/27/2018 09:27 AM  
   
 
Review of Systems Constitutional: negative Eyes: positive for contacts/glasses and visual disturbance Ears, nose, mouth, throat, and face: negative Respiratory: negative Cardiovascular: negative Gastrointestinal: negative Genitourinary:positive for frequency and Denies hematuria, abdominal pain or flank pain or dysuria Neurological: negative for headaches, dizziness and weakness Endocrine: positive for diabetic symptoms including polyuria and weight loss Objective:  
 
Visit Vitals /69 (BP 1 Location: Left arm, BP Patient Position: Sitting) Pulse (!) 114 Temp 98.2 °F (36.8 °C) (Oral) Resp 16 Ht 5' (1.524 m) Wt 126 lb 12.8 oz (57.5 kg) LMP 01/01/1980 SpO2 97% BMI 24.76 kg/m² Appearance: alert, well appearing, and in no distress. Exam: heart sounds normal rate, regular rhythm, normal S1, S2, no murmurs, rubs, clicks or gallops, S1 and S2 normal, tachycardia, chest clear, no hepatosplenomegaly, no carotid bruits, feet: warm, good capillary refill Lab review: labs reviewed, I note that glycosylated hemoglobin mildly abnormal but acceptable. Assessment/Plan:  
 
diabetes improved,  
hypertension on low normal, needs further observation. Diabetic issues reviewed with her: low cholesterol diet, weight control and daily exercise discussed, home glucose monitoring emphasized, all medications, side effects and compliance discussed carefully, foot care discussed and Podiatry visits discussed, annual eye examinations at Ophthalmology discussed, glycohemoglobin and other lab monitoring discussed, long term diabetic complications discussed and labs immediately prior to next visit. ICD-10-CM ICD-9-CM 1.  Type 2 diabetes mellitus without complication, without long-term current use of insulin ([facility]) E11.9 250.00 HEMOGLOBIN A1C WITH EAG  
   METABOLIC PANEL, BASIC Pt instructed to follow up with Optometry in regards to her faulty eye-glass prescription 2. Essential hypertension E07 403.6 METABOLIC PANEL, BASIC Pt with low normal BP on Lozol. Reports a 80 pound wt loss over 2 years due to walking and watching what she eats. Will decrease dose and re-evaluate 3. Tachycardia with heart rate 100-120 beats per minute U98.0 152.9 METABOLIC PANEL, BASIC Pt with asymptomatic low normal BP and tachycardia on Lozol. Will decrease dose as we may be overdiuresing her. Diagnoses and all orders for this visit: 
 
Type 2 diabetes mellitus without complication, without long-term current use of insulin ([facility] [address] Comments: 
Pt instructed to follow up with Optometry in regards to her faulty eye-glass prescription Essential hypertension Comments: 
Pt with low normal BP on Lozol. Reports a 80 pound wt loss over 2 years due to walking and watching what she eats. Will decrease dose and re-evaluate. If still low may consider discontinuing. Tachycardia with heart rate 100-120 beats per minute Comments: 
Pt with asymptomatic low normal BP and tachycardia on Lozol. Will decrease dose as we may be overdiuresing her. Discussed adequate hydration. Other orders 
-     HEMOGLOBIN A1C WITH EAG; Future -     METABOLIC PANEL, BASIC; Future Follow-up Disposition: 
Return in about 4 weeks (around 11/14/2018), or if symptoms worsen or fail to improve, for Blood pressure and heart rate check . Routine chronic care visit in 3 months

## 2018-11-05 ENCOUNTER — OFFICE VISIT (OUTPATIENT)
Dept: FAMILY MEDICINE CLINIC | Age: 63
End: 2018-11-05

## 2018-11-05 VITALS
HEART RATE: 102 BPM | SYSTOLIC BLOOD PRESSURE: 113 MMHG | RESPIRATION RATE: 18 BRPM | DIASTOLIC BLOOD PRESSURE: 67 MMHG | OXYGEN SATURATION: 100 %

## 2018-11-05 DIAGNOSIS — E11.9 TYPE 2 DIABETES MELLITUS WITHOUT COMPLICATION, WITHOUT LONG-TERM CURRENT USE OF INSULIN (HCC): ICD-10-CM

## 2018-11-05 RX ORDER — METFORMIN HYDROCHLORIDE 500 MG/1
500 TABLET ORAL
Qty: 60 TAB | Refills: 3 | Status: CANCELLED | OUTPATIENT
Start: 2018-11-05

## 2018-11-05 RX ORDER — METFORMIN HYDROCHLORIDE 500 MG/1
500 TABLET ORAL
Qty: 60 TAB | Refills: 3 | Status: SHIPPED | OUTPATIENT
Start: 2018-11-05 | End: 2019-01-23 | Stop reason: SDUPTHER

## 2018-11-05 NOTE — PROGRESS NOTES
Subjective: Ryley Martínez is a 58 y.o. female with hypertension. Current Outpatient Medications   Medication Sig Dispense Refill    glipiZIDE (GLUCOTROL) 5 mg tablet Take 1 Tab by mouth Before breakfast, lunch, and dinner. 90 Tab 3    aspirin (ALMAZ CHEWABLE ASPIRIN) 81 mg chewable tablet Take 81 mg by mouth daily.  indapamide (LOZOL) 2.5 mg tablet Take 1 Tab by mouth daily. (Patient taking differently: Take 1.25 mg by mouth daily.) 30 Tab 3    metFORMIN (GLUCOPHAGE) 500 mg tablet Take 1 Tab by mouth three (3) times daily (with meals). 60 Tab 3    Insulin Needles, Disposable, 31 gauge x 5/16\" ndle Use as directed to administer insulin 1 Package 11      Hypertension ROS: taking medications as instructed, no medication side effects noted, patient does not perform home BP monitoring, no TIA's, no chest pain on exertion, no dyspnea on exertion, no swelling of ankles. New concerns: none. Objective:   Visit Vitals  /67 (BP 1 Location: Right arm, BP Patient Position: Sitting)   Pulse (!) 102   Resp 18   LMP 01/01/1980   SpO2 100%      Appearance alert, well appearing, and in no distress, oriented to person, place, and time and playful, active. General exam BP noted to be well controlled today in office. Assessment:    Hypertension well controlled, stable. Plan: Pt will begin test BG every other day due to cost of strips. Her FBS this past week taken from meter range from 75 to 123.

## 2018-12-14 DIAGNOSIS — E11.9 TYPE 2 DIABETES MELLITUS WITHOUT COMPLICATION, WITHOUT LONG-TERM CURRENT USE OF INSULIN (HCC): ICD-10-CM

## 2018-12-17 RX ORDER — GLIPIZIDE 5 MG/1
TABLET ORAL
Qty: 90 TAB | Refills: 3 | Status: SHIPPED | OUTPATIENT
Start: 2018-12-17 | End: 2019-01-23 | Stop reason: SDUPTHER

## 2019-01-14 ENCOUNTER — LAB ONLY (OUTPATIENT)
Dept: FAMILY MEDICINE CLINIC | Age: 64
End: 2019-01-14

## 2019-01-14 ENCOUNTER — HOSPITAL ENCOUNTER (OUTPATIENT)
Dept: LAB | Age: 64
Discharge: HOME | End: 2019-01-14

## 2019-01-14 DIAGNOSIS — R00.0 TACHYCARDIA WITH HEART RATE 100-120 BEATS PER MINUTE: ICD-10-CM

## 2019-01-14 DIAGNOSIS — Z01.89 ROUTINE LAB DRAW: Primary | ICD-10-CM

## 2019-01-14 DIAGNOSIS — I10 ESSENTIAL HYPERTENSION: ICD-10-CM

## 2019-01-14 DIAGNOSIS — E11.9 TYPE 2 DIABETES MELLITUS WITHOUT COMPLICATION, WITHOUT LONG-TERM CURRENT USE OF INSULIN (HCC): ICD-10-CM

## 2019-01-14 LAB
ANION GAP SERPL CALC-SCNC: 4 MMOL/L (ref 3–18)
BUN SERPL-MCNC: 17 MG/DL (ref 7–18)
BUN/CREAT SERPL: 21 (ref 12–20)
CALCIUM SERPL-MCNC: 9.4 MG/DL (ref 8.5–10.1)
CHLORIDE SERPL-SCNC: 101 MMOL/L (ref 100–108)
CO2 SERPL-SCNC: 35 MMOL/L (ref 21–32)
CREAT SERPL-MCNC: 0.81 MG/DL (ref 0.6–1.3)
EST. AVERAGE GLUCOSE BLD GHB EST-MCNC: 108 MG/DL
GLUCOSE SERPL-MCNC: 112 MG/DL (ref 74–99)
HBA1C MFR BLD: 5.4 % (ref 4.2–5.6)
POTASSIUM SERPL-SCNC: 3.7 MMOL/L (ref 3.5–5.5)
SODIUM SERPL-SCNC: 140 MMOL/L (ref 136–145)

## 2019-01-14 PROCEDURE — 83036 HEMOGLOBIN GLYCOSYLATED A1C: CPT

## 2019-01-14 PROCEDURE — 80048 BASIC METABOLIC PNL TOTAL CA: CPT

## 2019-01-23 ENCOUNTER — OFFICE VISIT (OUTPATIENT)
Dept: FAMILY MEDICINE CLINIC | Age: 64
End: 2019-01-23

## 2019-01-23 VITALS
WEIGHT: 126 LBS | TEMPERATURE: 98.4 F | HEART RATE: 95 BPM | BODY MASS INDEX: 24.74 KG/M2 | RESPIRATION RATE: 18 BRPM | SYSTOLIC BLOOD PRESSURE: 115 MMHG | OXYGEN SATURATION: 98 % | HEIGHT: 60 IN | DIASTOLIC BLOOD PRESSURE: 71 MMHG

## 2019-01-23 DIAGNOSIS — E11.9 TYPE 2 DIABETES MELLITUS WITHOUT COMPLICATION, WITHOUT LONG-TERM CURRENT USE OF INSULIN (HCC): Primary | ICD-10-CM

## 2019-01-23 DIAGNOSIS — I10 ESSENTIAL HYPERTENSION: ICD-10-CM

## 2019-01-23 DIAGNOSIS — H26.9 CATARACT OF BOTH EYES, UNSPECIFIED CATARACT TYPE: ICD-10-CM

## 2019-01-23 RX ORDER — GLIPIZIDE 5 MG/1
TABLET ORAL
Qty: 90 TAB | Refills: 6 | Status: SHIPPED | OUTPATIENT
Start: 2019-01-23

## 2019-01-23 RX ORDER — INDAPAMIDE 1.25 MG/1
1.25 TABLET, FILM COATED ORAL DAILY
Qty: 30 TAB | Refills: 6 | Status: SHIPPED | OUTPATIENT
Start: 2019-01-23 | End: 2019-07-24 | Stop reason: SDUPTHER

## 2019-01-23 RX ORDER — METFORMIN HYDROCHLORIDE 500 MG/1
500 TABLET ORAL
Qty: 60 TAB | Refills: 6 | Status: SHIPPED | OUTPATIENT
Start: 2019-01-23

## 2019-01-23 NOTE — PROGRESS NOTES
01/23/19    PCP: Cassia Arriaza NP    Chief Complaint   Patient presents with    Follow Up Chronic Condition    Diabetes    Hypertension        HISTORY OF PRESENT ILLNESS  Angel Devries  is a 58 y.o. female whom presents for Follow Up Chronic Condition; Diabetes; and Hypertension         Patient reports she is taking her medications and doing well without side effects. Her only complaint today is blurry vision. She was seen by Optometrist in 10/2018 and was told she has cataract in both eyes with the left being worse. She reports her vision is getting worse and is starting to bother her. Follow Up Chronic Condition     Diabetes     Hypertension    Associated symptoms include blurred vision. Patient Active Problem List    Diagnosis Date Noted    Essential hypertension 07/18/2018    Type 2 diabetes mellitus without complication, without long-term current use of insulin ([facility] 07/18/2018     Current Outpatient Medications   Medication Sig Dispense Refill    glipiZIDE (GLUCOTROL) 5 mg tablet TAKE 1 TABLET BY MOUTH THREE TIMES DAILY BEFORE  BREAKFAST,  LUNCH  AND  DINNER 90 Tab 6    indapamide (LOZOL) 1.25 mg tablet Take 1 Tab by mouth daily. 30 Tab 6    metFORMIN (GLUCOPHAGE) 500 mg tablet Take 1 Tab by mouth three (3) times daily (with meals). 60 Tab 6    aspirin (ALMAZ CHEWABLE ASPIRIN) 81 mg chewable tablet Take 81 mg by mouth daily.       Insulin Needles, Disposable, 31 gauge x 5/16\" ndle Use as directed to administer insulin 1 Package 11     Allergies   Allergen Reactions    Claritin [Loratadine] Rash    Lisinopril Rash    Vistaril [Hydroxyzine Pamoate] Rash     Past Medical History:   Diagnosis Date    Arthritis     Diabetes ([facility]     Diabetic eye exam ([facility] 10/08/2018    Bilateral cataracts   No retinopathy    Hypertension      Past Surgical History:   Procedure Laterality Date    HX HYSTERECTOMY  1980    HX TONSILLECTOMY      as a child     Family History   Problem Relation Age of Onset    Breast Cancer Mother     Diabetes Mother     Hypertension Father     Thyroid Cancer Sister     Hypertension Brother     Cancer Maternal Grandmother      Social History     Tobacco Use    Smoking status: Former Smoker    Smokeless tobacco: Never Used   Substance Use Topics    Alcohol use: Yes     Comment: occasionally on holidays       Review of Systems   Constitutional: Negative. HENT: Negative. Eyes: Positive for blurred vision. Negative for pain, discharge and redness. Respiratory: Negative. Cardiovascular: Negative. Gastrointestinal: Negative. Genitourinary: Negative. Musculoskeletal: Negative. Skin: Negative. Neurological: Negative. Visit Vitals  /71 (BP 1 Location: Left arm, BP Patient Position: Sitting)   Pulse 95   Temp 98.4 °F (36.9 °C) (Oral)   Resp 18   Ht 5' (1.524 m)   Wt 126 lb (57.2 kg)   SpO2 98%   BMI 24.61 kg/m²       Pain Scale: 0 - No pain/10    Pain Location:      Physical Exam   Constitutional: She is oriented to person, place, and time and well-developed, well-nourished, and in no distress. HENT:   Head: Normocephalic. Eyes: Pupils are equal, round, and reactive to light. Neck: Normal range of motion. Neck supple. Cardiovascular: Normal rate, regular rhythm and normal heart sounds. Pulmonary/Chest: Effort normal and breath sounds normal.   Abdominal: Soft. Bowel sounds are normal.   Neurological: She is alert and oriented to person, place, and time. Skin: Skin is warm and dry. Psychiatric: Mood and affect normal.   Vitals reviewed.       Lab Results   Component Value Date/Time    WBC 4.9 10/06/2018 06:16 AM    HGB 12.2 10/06/2018 06:16 AM    HCT 37.3 10/06/2018 06:16 AM    PLATELET 082 48/63/1813 06:16 AM    MCV 86.9 10/06/2018 06:16 AM     Lab Results   Component Value Date/Time    Hemoglobin A1c 5.4 01/14/2019 07:44 AM    Hemoglobin A1c 7.6 (H) 09/27/2018 09:27 AM    Hemoglobin A1c 12.0 (H) 07/18/2018 01:08 PM    Glucose 112 (H) 01/14/2019 07:44 AM    Microalbumin/Creat ratio (mg/g creat) 11 09/27/2018 09:27 AM    Microalbumin,urine random 1.31 09/27/2018 09:27 AM    LDL, calculated 108.8 (H) 07/18/2018 01:08 PM    Creatinine 0.81 01/14/2019 07:44 AM      Lab Results   Component Value Date/Time    Cholesterol, total 184 07/18/2018 01:08 PM    HDL Cholesterol 38 (L) 07/18/2018 01:08 PM    LDL, calculated 108.8 (H) 07/18/2018 01:08 PM    Triglyceride 186 (H) 07/18/2018 01:08 PM    CHOL/HDL Ratio 4.8 07/18/2018 01:08 PM     Lab Results   Component Value Date/Time    Sodium 140 01/14/2019 07:44 AM    Potassium 3.7 01/14/2019 07:44 AM    Chloride 101 01/14/2019 07:44 AM    CO2 35 (H) 01/14/2019 07:44 AM    Anion gap 4 01/14/2019 07:44 AM    Glucose 112 (H) 01/14/2019 07:44 AM    BUN 17 01/14/2019 07:44 AM    Creatinine 0.81 01/14/2019 07:44 AM    BUN/Creatinine ratio 21 (H) 01/14/2019 07:44 AM    GFR est AA >60 01/14/2019 07:44 AM    GFR est non-AA >60 01/14/2019 07:44 AM    Calcium 9.4 01/14/2019 07:44 AM    Bilirubin, total 0.4 09/27/2018 09:27 AM    ALT (SGPT) 17 09/27/2018 09:27 AM    AST (SGOT) 16 09/27/2018 09:27 AM    Alk. phosphatase 59 09/27/2018 09:27 AM    Protein, total 7.3 09/27/2018 09:27 AM    Albumin 4.0 09/27/2018 09:27 AM    Globulin 3.3 09/27/2018 09:27 AM    A-G Ratio 1.2 09/27/2018 09:27 AM           ASSESSMENT and PLAN    ICD-10-CM ICD-9-CM    1. Type 2 diabetes mellitus without complication, without long-term current use of insulin (HCC) E11.9 250.00 glipiZIDE (GLUCOTROL) 5 mg tablet      metFORMIN (GLUCOPHAGE) 500 mg tablet      HEMOGLOBIN A1C WITH EAG   2. Essential hypertension I10 401.9 indapamide (LOZOL) 1.25 mg tablet      METABOLIC PANEL, BASIC   3. Cataract of both eyes, unspecified cataract type H26.9 366.9      Follow-up Disposition:  Return in about 4 months (around 5/23/2019).         Medications Discontinued During This Encounter   Medication Reason    indapamide (LOZOL) 2.5 mg tablet Reorder    glipiZIDE (GLUCOTROL) 5 mg tablet Reorder    metFORMIN (GLUCOPHAGE) 500 mg tablet Reorder       Written instructions followed our verbal discussion of all information discussed above, pending tests ordered and future goals/plans. Patient expressed understanding of current diagnosis, planned testing, follow up and if needed to contact the office for any questions or concerns prior to the next visit. Follow-up Disposition:  Return in about 4 months (around 5/23/2019).

## 2019-01-23 NOTE — PROGRESS NOTES
1. Have you been to the ER, urgent care clinic since your last visit? Hospitalized since your last visit? No    2. Have you seen or consulted any other health care providers outside of the [address] [place] [address] since your last visit? No    3. When was your last Pap smear? 02/2012  When was your last Mammogram? 2018  When was your last Colon screening? 2009 ? [facility]//Social Hx reviewed and updated as needed      Applicable screenings reviewed and updated as needed  Medication reconciliation performed. Patient does not need medication refills. Health Maintenance reviewed. Patient declined flu shot. Called Dr Brittany Yeung and left message with staff concerning referral to [facility] for cataract diagnosed in Oct per the patient.

## 2019-01-23 NOTE — PATIENT INSTRUCTIONS
Cataracts: Care Instructions  Your Care Instructions    A cataract is a clouding of the lens of the eye. The lens focuses light on the retina at the back of the eye. Cataracts block some of the light and make it harder for you to see clearly. Cataracts often develop when you get older. Most cataracts grow slowly. At first, you may just need stronger glasses to help you see better. Later, if the cataracts grow and begin to seriously impair your vision, you can have surgery to remove them. Follow-up care is a key part of your treatment and safety. Be sure to make and go to all appointments, and call your doctor if you are having problems. It's also a good idea to know your test results and keep a list of the medicines you take. How can you care for yourself at home? · Move room lights and use window shades to avoid glare. · Use more lighting or higher-watt bulbs. · Use a magnifying glass for reading. Look for large-print books and other reading material to make reading more enjoyable. · Have your eyes checked regularly, and update your glasses when needed. · Wear sunglasses to block out harmful sunlight. Buy sunglasses that screen out ultraviolet A and B (UVA and UVB) rays. · Do not smoke. Smoking can make cataracts worse. If you need help quitting, talk to your doctor about stop-smoking programs and medicines. These can increase your chances of quitting for good. When should you call for help? Watch closely for changes in your health, and be sure to contact your doctor if:    · Your vision is getting worse.     · You have increasing trouble doing everyday tasks, like driving or reading the newspaper, because of your eyesight. Where can you learn more? Go to http://libia-salas.info/. Enter P916 in the search box to learn more about \"Cataracts: Care Instructions. \"  Current as of: July 17, 2018  Content Version: 11.9  © 6313-3382 [employer].  Care instructions adapted under license by [employer] (which disclaims liability or warranty for this information). If you have questions about a medical condition or this instruction, always ask your healthcare professional. [address] any warranty or liability for your use of this information.

## 2019-03-18 ENCOUNTER — OFFICE VISIT (OUTPATIENT)
Dept: FAMILY MEDICINE CLINIC | Age: 64
End: 2019-03-18

## 2019-03-18 DIAGNOSIS — Z55.9 EDUCATIONAL CIRCUMSTANCE: Primary | ICD-10-CM

## 2019-03-18 SDOH — EDUCATIONAL SECURITY - EDUCATION ATTAINMENT: PROBLEMS RELATED TO EDUCATION AND LITERACY, UNSPECIFIED: Z55.9

## 2019-03-18 NOTE — PROGRESS NOTES
Pt to office today requesting to see provider about eye surgery. Ms. Rosenberg Settler is scheduled to have cataract removal done by Dr. Wayne Loredo on 4/4/2019 and wishes to cancel that surgery. She is very fearful of having the surgery and states \"I am just not ready\". She is instead looking for eye drops that \"will dissolve the cataracts, my friend told me they have them and they work\". Ms. Joe Coulter is out of the office today. I placed a call to [facility] and was on hold. Told patient I would contact her as soon as I talked to Ms. Joe Coulter and [place] eye staff.

## 2019-03-22 ENCOUNTER — HOSPITAL ENCOUNTER (OUTPATIENT)
Dept: LAB | Age: 64
Discharge: HOME | End: 2019-03-22

## 2019-03-22 LAB — XX-LABCORP SPECIMEN COL,LCBCF: NORMAL

## 2019-03-22 PROCEDURE — 99001 SPECIMEN HANDLING PT-LAB: CPT

## 2019-03-23 LAB
BUN SERPL-MCNC: 15 MG/DL (ref 8–27)
BUN/CREAT SERPL: 21 (ref 12–28)
CALCIUM SERPL-MCNC: 9.7 MG/DL (ref 8.7–10.3)
CHLORIDE SERPL-SCNC: 102 MMOL/L (ref 96–106)
CO2 SERPL-SCNC: 28 MMOL/L (ref 20–29)
CREAT SERPL-MCNC: 0.73 MG/DL (ref 0.57–1)
EST. AVERAGE GLUCOSE BLD GHB EST-MCNC: 111 MG/DL
GLUCOSE SERPL-MCNC: 97 MG/DL (ref 65–99)
HBA1C MFR BLD: 5.5 % (ref 4.8–5.6)
POTASSIUM SERPL-SCNC: 3.8 MMOL/L (ref 3.5–5.2)
SODIUM SERPL-SCNC: 145 MMOL/L (ref 134–144)
SPECIMEN STATUS REPORT, ROLRST: NORMAL

## 2019-03-28 ENCOUNTER — OFFICE VISIT (OUTPATIENT)
Dept: FAMILY MEDICINE CLINIC | Age: 64
End: 2019-03-28

## 2019-03-28 DIAGNOSIS — Z01.818 PRE-OPERATIVE CLEARANCE: Primary | ICD-10-CM

## 2019-03-28 NOTE — PROGRESS NOTES
The patient came in for surgical clearance. She reports she is no longer having the surgery and wanted to try other options for her cataracts. Patient advised to seek consultation with her eye specialists and to schedule f/u or surgical clearance as needed.

## 2019-05-14 ENCOUNTER — PATIENT OUTREACH (OUTPATIENT)
Dept: FAMILY MEDICINE CLINIC | Age: 64
End: 2019-05-14

## 2019-05-15 ENCOUNTER — TELEPHONE (OUTPATIENT)
Dept: FAMILY MEDICINE CLINIC | Age: 64
End: 2019-05-15

## 2019-05-15 ENCOUNTER — PATIENT OUTREACH (OUTPATIENT)
Dept: FAMILY MEDICINE CLINIC | Age: 64
End: 2019-05-15

## 2019-05-15 DIAGNOSIS — Z12.11 COLON CANCER SCREENING: Primary | ICD-10-CM

## 2019-05-15 DIAGNOSIS — Z12.31 BREAST CANCER SCREENING BY MAMMOGRAM: Primary | ICD-10-CM

## 2019-05-15 NOTE — TELEPHONE ENCOUNTER
Ms Oralia Wellington will reschedule her well woman visit during her OV next week. I've text the CS# to pt @ her request. Read and signed (via phone permission) Client Eligibility Program Form.

## 2019-05-16 ENCOUNTER — HOSPITAL ENCOUNTER (OUTPATIENT)
Dept: LAB | Age: 64
Discharge: HOME | End: 2019-05-16

## 2019-05-16 LAB — XX-LABCORP SPECIMEN COL,LCBCF: NORMAL

## 2019-05-16 PROCEDURE — 99001 SPECIMEN HANDLING PT-LAB: CPT

## 2019-05-17 ENCOUNTER — HOSPITAL ENCOUNTER (OUTPATIENT)
Dept: LAB | Age: 64
Discharge: HOME | End: 2019-05-17

## 2019-05-17 LAB
BUN SERPL-MCNC: 17 MG/DL (ref 8–27)
BUN/CREAT SERPL: 23 (ref 12–28)
CALCIUM SERPL-MCNC: 9.8 MG/DL (ref 8.7–10.3)
CHLORIDE SERPL-SCNC: 100 MMOL/L (ref 96–106)
CO2 SERPL-SCNC: 28 MMOL/L (ref 20–29)
CREAT SERPL-MCNC: 0.75 MG/DL (ref 0.57–1)
EST. AVERAGE GLUCOSE BLD GHB EST-MCNC: 117 MG/DL
GLUCOSE SERPL-MCNC: 108 MG/DL (ref 65–99)
HBA1C MFR BLD: 5.7 % (ref 4.8–5.6)
POTASSIUM SERPL-SCNC: 4.1 MMOL/L (ref 3.5–5.2)
SODIUM SERPL-SCNC: 143 MMOL/L (ref 134–144)
SPECIMEN STATUS REPORT, ROLRST: NORMAL
XX-LABCORP SPECIMEN COL,LCBCF: NORMAL

## 2019-05-17 PROCEDURE — 99001 SPECIMEN HANDLING PT-LAB: CPT

## 2019-05-19 LAB — HEMOCCULT STL QL IA: NEGATIVE

## 2019-05-20 ENCOUNTER — PATIENT OUTREACH (OUTPATIENT)
Dept: FAMILY MEDICINE CLINIC | Age: 64
End: 2019-05-20

## 2019-05-22 ENCOUNTER — OFFICE VISIT (OUTPATIENT)
Dept: FAMILY MEDICINE CLINIC | Age: 64
End: 2019-05-22

## 2019-05-22 VITALS
HEART RATE: 100 BPM | WEIGHT: 137 LBS | DIASTOLIC BLOOD PRESSURE: 80 MMHG | HEIGHT: 60 IN | SYSTOLIC BLOOD PRESSURE: 133 MMHG | TEMPERATURE: 98.3 F | RESPIRATION RATE: 18 BRPM | BODY MASS INDEX: 26.9 KG/M2 | OXYGEN SATURATION: 98 %

## 2019-05-22 DIAGNOSIS — I10 ESSENTIAL HYPERTENSION: ICD-10-CM

## 2019-05-22 DIAGNOSIS — E11.9 CONTROLLED TYPE 2 DIABETES MELLITUS WITHOUT COMPLICATION, WITHOUT LONG-TERM CURRENT USE OF INSULIN (HCC): Primary | ICD-10-CM

## 2019-05-22 DIAGNOSIS — M79.672 FOOT PAIN, BILATERAL: ICD-10-CM

## 2019-05-22 DIAGNOSIS — M79.671 FOOT PAIN, BILATERAL: ICD-10-CM

## 2019-05-22 RX ORDER — INSULIN PUMP SYRINGE, 3 ML
EACH MISCELLANEOUS
Qty: 1 KIT | Refills: 0 | Status: SHIPPED | OUTPATIENT
Start: 2019-05-22

## 2019-05-22 RX ORDER — LANCETS
EACH MISCELLANEOUS
Qty: 1 EACH | Refills: 11 | Status: SHIPPED | OUTPATIENT
Start: 2019-05-22

## 2019-05-22 RX ORDER — ROSUVASTATIN CALCIUM 5 MG/1
5 TABLET, COATED ORAL
Qty: 90 TAB | Refills: 3 | Status: SHIPPED | OUTPATIENT
Start: 2019-05-22

## 2019-05-22 NOTE — PROGRESS NOTES
05/22/19    PCP: Joey Nguyen NP    Chief Complaint   Patient presents with    Follow Up Chronic Condition    Diabetes    Hypertension      HISTORY OF PRESENT ILLNESS  Franck Mercado  is a 58 y.o. female whom presents for Follow Up Chronic Condition; Diabetes; and Hypertension     HPI  Diabetes Mellitus:  She has diabetes mellitus, and  hypertension. Diabetic ROS - medication compliance: compliant all of the time, diabetic diet compliance: compliant all of the time, home glucose monitoring: Average glucose reading 104, patient reports 1 episode of hypoglycemia when she did not eat, however she states she drank a pepsi and it went up , Entire glucose log was reviewed with patient at this visit. , further diabetic ROS: no polyuria or polydipsia, no chest pain, dyspnea or TIA's, no numbness, tingling or pain in extremities, last eye exam approximately 10/18 ago. Lab review: labs are reviewed, up to date and normal.       Patient Active Problem List    Diagnosis Date Noted    Essential hypertension 07/18/2018    Type 2 diabetes mellitus without complication, without long-term current use of insulin ([facility] 07/18/2018     Current Outpatient Medications   Medication Sig Dispense Refill    rosuvastatin (CRESTOR) 5 mg tablet Take 1 Tab by mouth nightly. 90 Tab 3    Blood-Glucose Meter monitoring kit Use as directed to check blood glucose levels daily 1 Kit 0    glucose blood VI test strips (BLOOD GLUCOSE TEST) strip Use as directed to check blood glucose daily 50 Strip 11    lancets misc Use as directed to check blood glucose levels 1 Each 11    glipiZIDE (GLUCOTROL) 5 mg tablet TAKE 1 TABLET BY MOUTH THREE TIMES DAILY BEFORE  BREAKFAST,  LUNCH  AND  DINNER 90 Tab 6    indapamide (LOZOL) 1.25 mg tablet Take 1 Tab by mouth daily. 30 Tab 6    metFORMIN (GLUCOPHAGE) 500 mg tablet Take 1 Tab by mouth three (3) times daily (with meals).  60 Tab 6    aspirin (ALMAZ CHEWABLE ASPIRIN) 81 mg chewable tablet Take 81 mg by mouth daily. Allergies   Allergen Reactions    Claritin [Loratadine] Rash    Lisinopril Rash    Vistaril [Hydroxyzine Pamoate] Rash     Past Medical History:   Diagnosis Date    Arthritis     Diabetes ([facility]     Diabetic eye exam ([facility] 10/08/2018    Bilateral cataracts   No retinopathy    Hypertension      Past Surgical History:   Procedure Laterality Date    HX HYSTERECTOMY  1980    HX TONSILLECTOMY      as a child     Family History   Problem Relation Age of Onset    Breast Cancer Mother     Diabetes Mother     Hypertension Father     Thyroid Cancer Sister     Hypertension Brother     Cancer Maternal Grandmother      Social History     Tobacco Use    Smoking status: Former Smoker    Smokeless tobacco: Never Used   Substance Use Topics    Alcohol use: Yes     Comment: occasionally on holidays       Review of Systems   Constitutional: Negative. HENT: Negative. Eyes: Positive for blurred vision. Patient has cataract. She declined surgery as she preferred a non-surgical option. Respiratory: Negative. Cardiovascular: Negative. Musculoskeletal: Positive for joint pain (Foot pain. Especially from walking). Neurological: Negative. Psychiatric/Behavioral: Negative. Visit Vitals  /80   Pulse 100   Temp 98.3 °F (36.8 °C)   Resp 18   Ht 5' (1.524 m)   Wt 137 lb (62.1 kg)   SpO2 98%   BMI 26.76 kg/m²       Pain Scale: 0 - No pain/10    Pain Location:      Physical Exam   Constitutional: She is oriented to person, place, and time and well-developed, well-nourished, and in no distress. HENT:   Head: Normocephalic. Eyes: Pupils are equal, round, and reactive to light. Neck: Normal range of motion. Neck supple. Cardiovascular: Normal rate, regular rhythm and normal heart sounds. Pulmonary/Chest: Effort normal and breath sounds normal.   Abdominal: Soft.  Bowel sounds are normal.   Musculoskeletal:        Right foot: Normal.        Left foot: Normal.   Neurological: She is alert and oriented to person, place, and time. Skin: Skin is warm and dry. Psychiatric: Mood and affect normal.   Vitals reviewed.     Diabetic foot exam:     Left: Reflexes 1+     Vibratory sensation normal    Proprioception normal   Sharp/dull discrimination normal    Filament test normal sensation with micro filament   Pulse DP: 2+ (normal)   Pulse PT: 2+ (normal)   Deformities: None  Right: Reflexes 1+   Vibratory sensation normal   Proprioception normal   Sharp/dull discrimination normal   Filament test normal sensation with micro filament   Pulse DP: 2+ (normal)   Pulse PT: 2+ (normal)   Deformities: None      Lab Results   Component Value Date/Time    WBC 4.9 10/06/2018 06:16 AM    HGB 12.2 10/06/2018 06:16 AM    HCT 37.3 10/06/2018 06:16 AM    PLATELET 219 53/72/6712 06:16 AM    MCV 86.9 10/06/2018 06:16 AM     Lab Results   Component Value Date/Time    Hemoglobin A1c 5.7 (H) 05/16/2019 12:00 AM    Hemoglobin A1c 5.5 03/22/2019 08:44 AM    Hemoglobin A1c 5.4 01/14/2019 07:44 AM    Glucose 108 (H) 05/16/2019 12:00 AM    Microalbumin/Creat ratio (mg/g creat) 11 09/27/2018 09:27 AM    Microalbumin,urine random 1.31 09/27/2018 09:27 AM    LDL, calculated 108.8 (H) 07/18/2018 01:08 PM    Creatinine 0.75 05/16/2019 12:00 AM      Lab Results   Component Value Date/Time    Cholesterol, total 184 07/18/2018 01:08 PM    HDL Cholesterol 38 (L) 07/18/2018 01:08 PM    LDL, calculated 108.8 (H) 07/18/2018 01:08 PM    Triglyceride 186 (H) 07/18/2018 01:08 PM    CHOL/HDL Ratio 4.8 07/18/2018 01:08 PM     Lab Results   Component Value Date/Time    Sodium 143 05/16/2019 12:00 AM    Potassium 4.1 05/16/2019 12:00 AM    Chloride 100 05/16/2019 12:00 AM    CO2 28 05/16/2019 12:00 AM    Anion gap 4 01/14/2019 07:44 AM    Glucose 108 (H) 05/16/2019 12:00 AM    BUN 17 05/16/2019 12:00 AM    Creatinine 0.75 05/16/2019 12:00 AM    BUN/Creatinine ratio 23 05/16/2019 12:00 AM    GFR est AA 97 05/16/2019 12:00 AM    GFR est non-AA 85 05/16/2019 12:00 AM    Calcium 9.8 05/16/2019 12:00 AM    Bilirubin, total 0.4 09/27/2018 09:27 AM    ALT (SGPT) 17 09/27/2018 09:27 AM    AST (SGOT) 16 09/27/2018 09:27 AM    Alk. phosphatase 59 09/27/2018 09:27 AM    Protein, total 7.3 09/27/2018 09:27 AM    Albumin 4.0 09/27/2018 09:27 AM    Globulin 3.3 09/27/2018 09:27 AM    A-G Ratio 1.2 09/27/2018 09:27 AM         ASSESSMENT and PLAN  Diagnoses and all orders for this visit:    1. Controlled type 2 diabetes mellitus without complication, without long-term current use of insulin ([facility])  Comments:  Statin therapy added to regimen   Orders:  -      DIABETES FOOT EXAM  -     rosuvastatin (CRESTOR) 5 mg tablet; Take 1 Tab by mouth nightly. -     Blood-Glucose Meter monitoring kit; Use as directed to check blood glucose levels daily  -     glucose blood VI test strips (BLOOD GLUCOSE TEST) strip; Use as directed to check blood glucose daily  -     lancets misc; Use as directed to check blood glucose levels  -     METABOLIC PANEL, COMPREHENSIVE; Future  -     LIPID PANEL; Future  -     HEMOGLOBIN A1C WITH EAG; Future  -     CBC WITH AUTOMATED DIFF; Future    2. Essential hypertension  -     METABOLIC PANEL, COMPREHENSIVE; Future  -     LIPID PANEL; Future  -     CBC WITH AUTOMATED DIFF; Future    3. Foot pain, bilateral  Comments:  Arthritic in nature. recommend supportive shoes and arches. NSAIDS or tylenol as needed for pain           Medications Discontinued During This Encounter   Medication Reason    Insulin Needles, Disposable, 31 gauge x 5/16\" ndle Not A Current Medication    pitavastatin calcium (LIVALO) 1 mg tab tablet Alternate Therapy    pitavastatin calcium (LIVALO) 1 mg tab tablet Alternate Therapy       Written instructions followed our verbal discussion of all information discussed above, pending tests ordered and future goals/plans.  Patient expressed understanding of current diagnosis, planned testing, follow up and if needed to contact the office for any questions or concerns prior to the next visit. Follow-up and Dispositions    · Return in about 4 months (around 9/22/2019), or if symptoms worsen or fail to improve.

## 2019-05-22 NOTE — PATIENT INSTRUCTIONS
Learning About Statins for People With Diabetes  Introduction  Statins are medicines that help with your cholesterol. Cholesterol is a type of fat in your blood. If you have too much of this fat, it can build up in blood vessels. This raises your risk of heart disease, heart attack, and stroke. Many people with diabetes take statins. Diabetes can cause problems in your body that may also lead to heart disease. That means your risks of heart attack and stroke are higher when you have diabetes. Statins can lower your risk. Your doctor may prescribe a statin if:  · You have diabetes. · AND you are age 36 to 76. Statins may help people with diabetes at other ages too. Your doctor can help you decide if statins may help you. Examples  Common statins include:  · Atorvastatin (Lipitor). · Pravastatin (Pravachol). · Rosuvastatin (Crestor). · Simvastatin (Zocor). Possible side effects  Some people who take statins report that they have more muscle aches. But it's not clear whether these are actually a side effect of statins. Most side effects will go away if you stop taking the medicine. You may have other side effects not described here. Check the information that comes with your medicine. What to know about taking this medicine  · You must take statins on a regular basis for them to work well. If you stop, your risk for heart attack and stroke may go back up. · Be safe with medicines. Take your medicines exactly as prescribed. Call your doctor if you think you are having a problem with your medicine. · If you have side effects that bother you, talk to your doctor. You may be able to take a different statin. · Check with your doctor or pharmacist before you use any other medicines. This includes over-the-counter medicines. Make sure your doctor knows all of the medicines, vitamins, herbal products, and supplements you take. Taking some medicines together can cause problems. Where can you learn more?   Go to http://jessica.info/. Enter T114 in the search box to learn more about \"Learning About Statins for People With Diabetes. \"  Current as of: July 25, 2018  Content Version: 11.9  © 2719-2478 [employer]. Care instructions adapted under license by [employer] (which disclaims liability or warranty for this information). If you have questions about a medical condition or this instruction, always ask your healthcare professional. [address] any warranty or liability for your use of this information.

## 2019-05-24 ENCOUNTER — HOSPITAL ENCOUNTER (OUTPATIENT)
Dept: MAMMOGRAPHY | Age: 64
Discharge: HOME | End: 2019-05-24
Attending: NURSE PRACTITIONER
Payer: MEDICAID

## 2019-05-24 ENCOUNTER — PATIENT OUTREACH (OUTPATIENT)
Dept: FAMILY MEDICINE CLINIC | Age: 64
End: 2019-05-24

## 2019-05-24 DIAGNOSIS — Z12.31 BREAST CANCER SCREENING BY MAMMOGRAM: ICD-10-CM

## 2019-05-24 PROCEDURE — 77067 SCR MAMMO BI INCL CAD: CPT

## 2019-05-24 NOTE — PROGRESS NOTES
[facility] health screening:    Noted negative mammogram from yesterday and awaiting updating of report to HM. Still needs to schedule her well woman visit.

## 2019-07-09 ENCOUNTER — OFFICE VISIT (OUTPATIENT)
Dept: FAMILY MEDICINE CLINIC | Age: 64
End: 2019-07-09

## 2019-07-09 DIAGNOSIS — Z55.9 EDUCATIONAL CIRCUMSTANCE: Primary | ICD-10-CM

## 2019-07-09 SDOH — EDUCATIONAL SECURITY - EDUCATION ATTAINMENT: PROBLEMS RELATED TO EDUCATION AND LITERACY, UNSPECIFIED: Z55.9

## 2019-07-09 NOTE — PROGRESS NOTES
Pt here this am , brought in glucometer which is new and she does not know how to use. Showed patient how to turn on machine, insert strip and test. Pt's reading 276. Pt very surprised by reading, \"I only had potato salad, hush puppies and ice cream sandwich last night\". Pt to keep readings for 2 weeks and RTO for review. If she is unsure about meter she was given my number to call for assistance.

## 2019-07-12 ENCOUNTER — PATIENT OUTREACH (OUTPATIENT)
Dept: FAMILY MEDICINE CLINIC | Age: 64
End: 2019-07-12

## 2019-07-23 ENCOUNTER — NURSE NAVIGATOR (OUTPATIENT)
Dept: FAMILY MEDICINE CLINIC | Age: 64
End: 2019-07-23

## 2019-07-23 ENCOUNTER — OFFICE VISIT (OUTPATIENT)
Dept: FAMILY MEDICINE CLINIC | Age: 64
End: 2019-07-23

## 2019-07-23 ENCOUNTER — TELEPHONE (OUTPATIENT)
Dept: FAMILY MEDICINE CLINIC | Age: 64
End: 2019-07-23

## 2019-07-23 DIAGNOSIS — I10 ESSENTIAL HYPERTENSION: ICD-10-CM

## 2019-07-23 DIAGNOSIS — Z71.89 DIABETES EDUCATION, ENCOUNTER FOR: Primary | ICD-10-CM

## 2019-07-24 RX ORDER — INDAPAMIDE 1.25 MG/1
1.25 TABLET, FILM COATED ORAL DAILY
Qty: 30 TAB | Refills: 6 | Status: SHIPPED | OUTPATIENT
Start: 2019-07-24

## 2019-07-26 ENCOUNTER — PATIENT OUTREACH (OUTPATIENT)
Dept: FAMILY MEDICINE CLINIC | Age: 64
End: 2019-07-26

## 2019-07-29 ENCOUNTER — PATIENT OUTREACH (OUTPATIENT)
Dept: FAMILY MEDICINE CLINIC | Age: 64
End: 2019-07-29

## 2019-07-31 ENCOUNTER — HOSPITAL ENCOUNTER (OUTPATIENT)
Dept: LAB | Age: 64
Discharge: HOME | End: 2019-07-31

## 2019-07-31 ENCOUNTER — OFFICE VISIT (OUTPATIENT)
Dept: FAMILY MEDICINE CLINIC | Age: 64
End: 2019-07-31

## 2019-07-31 VITALS
DIASTOLIC BLOOD PRESSURE: 80 MMHG | SYSTOLIC BLOOD PRESSURE: 135 MMHG | WEIGHT: 144.4 LBS | OXYGEN SATURATION: 100 % | TEMPERATURE: 99.1 F | HEIGHT: 60 IN | BODY MASS INDEX: 28.35 KG/M2 | RESPIRATION RATE: 20 BRPM | HEART RATE: 102 BPM

## 2019-07-31 DIAGNOSIS — Z01.419 WELL WOMAN EXAM WITH ROUTINE GYNECOLOGICAL EXAM: ICD-10-CM

## 2019-07-31 DIAGNOSIS — Z01.419 WELL WOMAN EXAM WITH ROUTINE GYNECOLOGICAL EXAM: Primary | ICD-10-CM

## 2019-07-31 PROCEDURE — 87798 DETECT AGENT NOS DNA AMP: CPT

## 2019-07-31 PROCEDURE — 88142 CYTOPATH C/V THIN LAYER: CPT

## 2019-07-31 NOTE — PATIENT INSTRUCTIONS
Well Visit, Women 48 to 72: Care Instructions  Your Care Instructions    Physical exams can help you stay healthy. Your doctor has checked your overall health and may have suggested ways to take good care of yourself. He or she also may have recommended tests. At home, you can help prevent illness with healthy eating, regular exercise, and other steps. Follow-up care is a key part of your treatment and safety. Be sure to make and go to all appointments, and call your doctor if you are having problems. It's also a good idea to know your test results and keep a list of the medicines you take. How can you care for yourself at home? · Reach and stay at a healthy weight. This will lower your risk for many problems, such as obesity, diabetes, heart disease, and high blood pressure. · Get at least 30 minutes of exercise on most days of the week. Walking is a good choice. You also may want to do other activities, such as running, swimming, cycling, or playing tennis or team sports. · Do not smoke. Smoking can make health problems worse. If you need help quitting, talk to your doctor about stop-smoking programs and medicines. These can increase your chances of quitting for good. · Protect your skin from too much sun. When you're outdoors from 10 a.m. to 4 p.m., stay in the shade or cover up with clothing and a hat with a wide brim. Wear sunglasses that block UV rays. Even when it's cloudy, put broad-spectrum sunscreen (SPF 30 or higher) on any exposed skin. · See a dentist one or two times a year for checkups and to have your teeth cleaned. · Wear a seat belt in the car. Follow your doctor's advice about when to have certain tests. These tests can spot problems early. · Cholesterol. Your doctor will tell you how often to have this done based on your age, family history, or other things that can increase your risk for heart attack and stroke. · Blood pressure.  Have your blood pressure checked during a routine doctor visit. Your doctor will tell you how often to check your blood pressure based on your age, your blood pressure results, and other factors. · Mammogram. Ask your doctor how often you should have a mammogram, which is an X-ray of your breasts. A mammogram can spot breast cancer before it can be felt and when it is easiest to treat. · Pap test and pelvic exam. Ask your doctor how often you should have a Pap test. You may not need to have a Pap test as often as you used to. · Vision. Have your eyes checked every year or two or as often as your doctor suggests. Some experts recommend that you have yearly exams for glaucoma and other age-related eye problems starting at age 48. · Hearing. Tell your doctor if you notice any change in your hearing. You can have tests to find out how well you hear. · Diabetes. Ask your doctor whether you should have tests for diabetes. · Colorectal cancer. Your risk for colorectal cancer gets higher as you get older. Some experts say that adults should start regular screening at age 48 and stop at age 76. Others say to start before age 48 or continue after age 76. Talk with your doctor about your risk and when to start and stop screening. · Thyroid disease. Talk to your doctor about whether to have your thyroid checked as part of a regular physical exam. Women have an increased chance of a thyroid problem. · Osteoporosis. You should begin tests for bone density at age 72. If you are younger than 72, ask your doctor whether you have factors that may increase your risk for this disease. You may want to have this test before age 72. · Heart attack and stroke risk. At least every 4 to 6 years, you should have your risk for heart attack and stroke assessed. Your doctor uses factors such as your age, blood pressure, cholesterol, and whether you smoke or have diabetes to show what your risk for a heart attack or stroke is over the next 10 years.   When should you call for help?  Watch closely for changes in your health, and be sure to contact your doctor if you have any problems or symptoms that concern you. Where can you learn more? Go to http://libia-salas.info/. Enter J850 in the search box to learn more about \"Well Visit, Women 50 to 72: Care Instructions. \"  Current as of: December 13, 2018  Content Version: 12.1  © 6951-6972 Healthwise, [employer] Care instructions adapted under license by [employer] (which disclaims liability or warranty for this information). If you have questions about a medical condition or this instruction, always ask your healthcare professional. [address] any warranty or liability for your use of this information.

## 2019-07-31 NOTE — PROGRESS NOTES
Chief Complaint   Patient presents with    Well Woman     1. Have you been to the ER, urgent care clinic since your last visit? Hospitalized since your last visit? No    2. Have you seen or consulted any other health care providers outside of the [address] [place] [address] since your last visit? No    3. When was your last Pap smear? 2012  When was your last Mammogram? 5/24/2019  When was your last Colon screening? 5/15/2019 FIT Test Neg    PMH/FH/Social Hx reviewed and updated as needed      Applicable screenings reviewed and updated as needed  Medication reconciliation performed. Patient does not need medication refills. Health Maintenance reviewed.

## 2019-07-31 NOTE — PROGRESS NOTES
Subjective:   58 y.o. female for Well Woman Check. She is postmenopausal.  Social History: has sex with men and sometimes uses barrier protection - post menopausal status. Pertinent past medical hstory: Diabetes. Patient Active Problem List   Diagnosis Code    Essential hypertension I10    Type 2 diabetes mellitus without complication, without long-term current use of insulin ([facility]) E11.9     Current Outpatient Medications   Medication Sig Dispense Refill    indapamide (LOZOL) 1.25 mg tablet Take 1 Tab by mouth daily. 30 Tab 6    rosuvastatin (CRESTOR) 5 mg tablet Take 1 Tab by mouth nightly. 90 Tab 3    Blood-Glucose Meter monitoring kit Use as directed to check blood glucose levels daily 1 Kit 0    glucose blood VI test strips (BLOOD GLUCOSE TEST) strip Use as directed to check blood glucose daily 50 Strip 11    lancets misc Use as directed to check blood glucose levels 1 Each 11    glipiZIDE (GLUCOTROL) 5 mg tablet TAKE 1 TABLET BY MOUTH THREE TIMES DAILY BEFORE  BREAKFAST,  LUNCH  AND  DINNER 90 Tab 6    metFORMIN (GLUCOPHAGE) 500 mg tablet Take 1 Tab by mouth three (3) times daily (with meals). 60 Tab 6    aspirin (ALMAZ CHEWABLE ASPIRIN) 81 mg chewable tablet Take 81 mg by mouth daily.       metFORMIN (GLUCOPHAGE) 500 mg tablet TAKE 1 TABLET BY MOUTH THREE TIMES DAILY WITH MEALS 60 Tab 6     Allergies   Allergen Reactions    Claritin [Loratadine] Rash    Lisinopril Rash    Vistaril [Hydroxyzine Pamoate] Rash     Past Medical History:   Diagnosis Date    Arthritis     Diabetes ([facility] [address]     Diabetic eye exam ([facility] [address] 10/08/2018    Bilateral cataracts   No retinopathy    Hypertension      Past Surgical History:   Procedure Laterality Date    HX HYSTERECTOMY  1980    HX TONSILLECTOMY      as a child     Family History   Problem Relation Age of Onset   Gitajey Gil Breast Cancer Mother     Diabetes Mother     Hypertension Father     Thyroid Cancer Sister     Hypertension Brother     Cancer Maternal Grandmother      Social History     Tobacco Use    Smoking status: Former Smoker    Smokeless tobacco: Never Used   Substance Use Topics    Alcohol use: Yes     Comment: occasionally on holidays        ROS:  Feeling well. No dyspnea or chest pain on exertion. No abdominal pain, change in bowel habits, black or bloody stools. No urinary tract symptoms. GYN ROS: no breast pain or new or enlarging lumps on self exam, no vaginal bleeding, she complains of some vaginal discharge occasionally. Menopausal symptoms: none. No neurological complaints. FIT test negative 05/2019  Last Mammogram: 05/2019    Objective:     Visit Vitals  /80 (BP 1 Location: Left arm, BP Patient Position: Sitting)   Pulse (!) 102   Temp 99.1 °F (37.3 °C) (Oral)   Resp 20   Ht 5' (1.524 m)   Wt 144 lb 6.4 oz (65.5 kg)   LMP 01/01/1980   SpO2 100%   BMI 28.20 kg/m²     The patient appears well, alert, oriented x 3, in no distress. ENT normal.  Neck supple. No adenopathy or thyromegaly. KENA. Lungs are clear, good air entry, no wheezes, rhonchi or rales. S1 and S2 normal, no murmurs, regular rate and rhythm. Abdomen soft without tenderness, guarding, mass or organomegaly. Extremities show no edema, normal peripheral pulses. Neurological is normal, no focal findings. BREAST EXAM: breasts appear normal, no suspicious masses, no skin or nipple changes or axillary nodes    PELVIC EXAM: normal external genitalia, vulva, vagina, cervix, uterus and adnexa    Assessment/Plan:   well woman  postmenopausal  mammogram  pap smear  return annually or prn      ICD-10-CM ICD-9-CM    1. Well woman exam with routine gynecological exam Z01.419 V72.31 PAP, LB, RFX HPV [id number])      NUSWAB VAGINITIS PLUS     Follow-up and Dispositions    · Return if symptoms worsen or fail to improve.

## 2019-08-01 ENCOUNTER — PATIENT OUTREACH (OUTPATIENT)
Dept: FAMILY MEDICINE CLINIC | Age: 64
End: 2019-08-01

## 2019-08-01 NOTE — PROGRESS NOTES
[facility] health screening:    Well woman resulted in negative pap smear, now updated in . Closed this episode of care.

## 2019-08-06 LAB
A VAGINAE DNA VAG QL NAA+PROBE: NORMAL SCORE
BVAB2 DNA VAG QL NAA+PROBE: NORMAL SCORE
C ALBICANS DNA VAG QL NAA+PROBE: NEGATIVE
C GLABRATA DNA VAG QL NAA+PROBE: NEGATIVE
C TRACH RRNA SPEC QL NAA+PROBE: NEGATIVE
MEGA1 DNA VAG QL NAA+PROBE: NORMAL SCORE
N GONORRHOEA RRNA SPEC QL NAA+PROBE: NEGATIVE
SPECIMEN SOURCE: NORMAL
T VAGINALIS RRNA SPEC QL NAA+PROBE: NEGATIVE

## 2019-08-13 ENCOUNTER — OFFICE VISIT (OUTPATIENT)
Dept: FAMILY MEDICINE CLINIC | Age: 64
End: 2019-08-13

## 2019-08-13 VITALS
RESPIRATION RATE: 20 BRPM | DIASTOLIC BLOOD PRESSURE: 74 MMHG | SYSTOLIC BLOOD PRESSURE: 126 MMHG | BODY MASS INDEX: 28.27 KG/M2 | OXYGEN SATURATION: 97 % | HEART RATE: 104 BPM | WEIGHT: 144 LBS | TEMPERATURE: 98.5 F | HEIGHT: 60 IN
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
## 2019-08-13 DIAGNOSIS — L29.9 ITCHING: ICD-10-CM

## 2019-08-13 RX ORDER — DIPHENHYDRAMINE HCL 25 MG
50 TABLET ORAL
Qty: 30 TAB | Refills: 0
Start: 2019-08-13 | End: 2019-08-20 | Stop reason: SINTOL

## 2019-08-13 NOTE — PROGRESS NOTES
Patient concern with something biting her all over mostly happen when in the bed. Patient says it started last Friday.

## 2019-08-13 NOTE — PATIENT INSTRUCTIONS
Bedbugs: Care Instructions  Your Care Instructions    Bedbugs are tiny bugs that feed on blood from animals or people. They have that name because they like to hide in bedding and mattresses. Bedbugs are not known to spread disease to people. But itching from the bites can be so bad that you may scratch hard enough to break the skin. That can lead to infection. The bites can also cause an allergic reaction in some people. The bugs can spread into cracks and crevices in the room and lay their eggs in anything that is in the room, including clothing and furniture. This makes them very hard to kill. Getting rid of bedbugs  The best way to get rid of bedbugs is to call a professional pest control company. They use special pesticides and other equipment to kill the bugs and their eggs. If you decide to buy your own pesticide, check the label. Make sure it says that it is for bedbugs. Be sure to follow the directions carefully. You may have to use the pesticide more than once. Do other cleaning steps such as:  · Vacuum often. Be sure to empty the vacuum after each use. If you use a vacuum bag, seal it and throw it out in an outdoor trash can. If you don't use a vacuum bag, empty the container and clean it with hot, soapy water. · Launder things that might hide bugs. Washing and then drying items in a dryer on a hot setting is adequate to kill bedbugs in clothing or linens. Turn the dryer to the hottest setting that the fabric can handle. · Use mattress, box spring, and pillow (encasement) sacks to trap bed bugs and help get rid of them. Be sure to follow the directions on the package. After your mattress has been cleared of bedbugs, you can buy a special mattress cover that is made to keep bedbugs out of your mattress. Follow-up care is a key part of your treatment and safety. Be sure to make and go to all appointments, and call your doctor if you are having problems.  It's also a good idea to know your test results and keep a list of the medicines you take. How can you care for yourself at home? · Wash the bites with soap to lower the chance of infection. Try not to scratch. · Use calamine lotion or an anti-itch cream to stop the itching. You can also hold an oatmeal-soaked washcloth on the itchy area for 15 minutes. You can buy an oatmeal powder, such as Aveeno Colloidal Oatmeal, in drugstores. · Use an ice pack to stop the swelling. When should you call for help? Call your doctor now or seek immediate medical care if:    · You have signs of infection, such as:  ? Increased pain, swelling, warmth, or redness. ? Red streaks leading from a bite area. ? Pus draining from a bite area. ? A fever.    Watch closely for changes in your health, and be sure to contact your doctor if:    · Anyone else in your family has itching.     · You do not get better as expected. Where can you learn more? Go to http://libia-salas.info/. Enter G246 in the search box to learn more about \"Bedbugs: Care Instructions. \"  Current as of: September 23, 2018  Content Version: 12.1  © 9747-1101 Healthwise, [employer]. Care instructions adapted under license by [employer] (which disclaims liability or warranty for this information). If you have questions about a medical condition or this instruction, always ask your healthcare professional. [place] [address] any warranty or liability for your use of this information.

## 2019-08-20 ENCOUNTER — OFFICE VISIT (OUTPATIENT)
Dept: FAMILY MEDICINE CLINIC | Age: 64
End: 2019-08-20

## 2019-08-20 VITALS
OXYGEN SATURATION: 97 % | DIASTOLIC BLOOD PRESSURE: 78 MMHG | SYSTOLIC BLOOD PRESSURE: 126 MMHG | HEIGHT: 60 IN | RESPIRATION RATE: 20 BRPM | TEMPERATURE: 98.5 F | HEART RATE: 112 BPM | BODY MASS INDEX: 28.62 KG/M2 | WEIGHT: 145.8 LBS

## 2019-08-20 DIAGNOSIS — L50.9 URTICARIA: Primary | ICD-10-CM

## 2019-08-20 RX ORDER — PREDNISONE 10 MG/1
TABLET ORAL
Qty: 21 TAB | Refills: 0 | Status: SHIPPED | OUTPATIENT
Start: 2019-08-20 | End: 2019-08-28 | Stop reason: ALTCHOICE

## 2019-08-20 RX ORDER — LIDOCAINE HYDROCHLORIDE AND HYDROCORTISONE ACETATE 30; 5 MG/G; MG/G
CREAM TOPICAL 2 TIMES DAILY
Qty: 85 G | Refills: 0 | Status: SHIPPED | OUTPATIENT
Start: 2019-08-20

## 2019-08-20 NOTE — PATIENT INSTRUCTIONS
Hives: Care Instructions  Your Care Instructions  Hives are raised, red, itchy patches of skin. They are also called wheals or welts. They usually have red borders and pale centers. Hives range in size from ¼ inch to 3 inches or more across. They may seem to move from place to place on the skin. Several hives may form a large area of raised, red skin. You can get hives after an insect sting, after taking medicine or eating certain foods, or because of infection or stress. Other causes include plants, things you breathe in, makeup, heat, cold, sunlight, and latex. You cannot spread hives to other people. Hives may last a few minutes or a few days, but a single spot may last less than 36 hours. Follow-up care is a key part of your treatment and safety. Be sure to make and go to all appointments, and call your doctor if you are having problems. It's also a good idea to know your test results and keep a list of the medicines you take. How can you care for yourself at home? · Avoid whatever you think may have caused your hives, such as a certain food or medicine. However, you may not know the cause. · Put a cool, wet towel on the area to relieve itching. · Take an over-the-counter antihistamine, such as diphenhydramine (Benadryl), cetirizine (Zyrtec), or loratadine (Claritin), to help stop the hives and calm the itching. Read and follow directions on the label. These medicines can make you feel sleepy. Do not drive while using them. · Stay away from strong soaps, detergents, and chemicals. These can make itching worse. When should you call for help? Call 911 anytime you think you may need emergency care. For example, call if:    · You have symptoms of a severe allergic reaction. These may include:  ? Sudden raised, red areas (hives) all over your body. ? Swelling of the throat, mouth, lips, or tongue. ? Trouble breathing. ? Passing out (losing consciousness).  Or you may feel very lightheaded or suddenly feel weak, confused, or restless.    Call your doctor now or seek immediate medical care if:    · You have symptoms of an allergic reaction, such as:  ? A rash or hives (raised, red areas on the skin). ? Itching. ? Swelling. ? Belly pain, nausea, or vomiting.     · You get hives after you start a new medicine.     · Hives have not gone away after 24 hours.    Watch closely for changes in your health, and be sure to contact your doctor if:    · You do not get better as expected. Where can you learn more? Go to http://libia-salas.info/. Enter U296 in the search box to learn more about \"Hives: Care Instructions. \"  Current as of: September 23, 2018  Content Version: 12.1  © 9173-6675 Healthwise, [employer]. Care instructions adapted under license by [employer] (which disclaims liability or warranty for this information). If you have questions about a medical condition or this instruction, always ask your healthcare professional. [address] any warranty or liability for your use of this information.

## 2019-08-20 NOTE — PROGRESS NOTES
08/20/19    PCP: Gomez Sandra NP    Chief Complaint   Patient presents with    Swelling     c/o swelling of neck, face and arms with pain level 5/10 x 1 week        HISTORY OF PRESENT ILLNESS  Yanni Pisano  is a 58 y.o. female whom presents for Swelling (c/o swelling of neck, face and arms with pain level 5/10 x 1 week)    Patient was seen last week and treated for Bed bugs. She reports starting benadryl and development of rash. Reports rash to bilateral ars, neck, and trunk with swelling and itching. She states  came and did not find bed bugs however she has thrown out mattress and cleaned entire home. She denies any new known exposures or animals in home. She does have an allergy to Claritin (rash)     Rash    The rash is present on the left arm, neck, right arm and trunk. Associated symptoms include itching, pain and hives. Risk factors include new medication (Benadryl). She has tried oral antihistamines for the symptoms. The treatment provided no relief. Patient Active Problem List    Diagnosis Date Noted    Essential hypertension 07/18/2018    Type 2 diabetes mellitus without complication, without long-term current use of insulin ([facility] [address] 07/18/2018     Current Outpatient Medications   Medication Sig Dispense Refill    predniSONE (STERAPRED DS) 10 mg dose pack See administration instruction per 10mg dose pack 21 Tab 0    lidocaine HCl-hydrocortison ac topical cream Apply  to affected area two (2) times a day. 85 g 0    indapamide (LOZOL) 1.25 mg tablet Take 1 Tab by mouth daily. 30 Tab 6    rosuvastatin (CRESTOR) 5 mg tablet Take 1 Tab by mouth nightly.  90 Tab 3    Blood-Glucose Meter monitoring kit Use as directed to check blood glucose levels daily 1 Kit 0    glucose blood VI test strips (BLOOD GLUCOSE TEST) strip Use as directed to check blood glucose daily 50 Strip 11    lancets misc Use as directed to check blood glucose levels 1 Each 11    glipiZIDE (GLUCOTROL) 5 mg tablet TAKE 1 TABLET BY MOUTH THREE TIMES DAILY BEFORE  BREAKFAST,  LUNCH  AND  DINNER 90 Tab 6    metFORMIN (GLUCOPHAGE) 500 mg tablet Take 1 Tab by mouth three (3) times daily (with meals). 60 Tab 6    aspirin (ALMAZ CHEWABLE ASPIRIN) 81 mg chewable tablet Take 81 mg by mouth daily.  metFORMIN (GLUCOPHAGE) 500 mg tablet TAKE 1 TABLET BY MOUTH THREE TIMES DAILY WITH MEALS 60 Tab 6     Allergies   Allergen Reactions    Benadryl [Diphenhydramine Hcl] Rash     Hives      Claritin [Loratadine] Rash    Lisinopril Rash    Vistaril [Hydroxyzine Pamoate] Rash     Past Medical History:   Diagnosis Date    Arthritis     [facility] ([facility] [address]     Diabetic eye exam ([facility] [address] 10/08/2018    Bilateral cataracts   No retinopathy    Hypertension      Past Surgical History:   Procedure Laterality Date    HX HYSTERECTOMY  1980    HX TONSILLECTOMY      as a child     Family History   Problem Relation Age of Onset    Breast Cancer Mother     Diabetes Mother     Hypertension Father     Thyroid Cancer Sister     Hypertension Brother     Cancer Maternal Grandmother      Social History     Tobacco Use    Smoking status: Former Smoker    Smokeless tobacco: Never Used   Substance Use Topics    Alcohol use: Yes     Comment: occasionally on holidays       Review of Systems   Constitutional: Negative. Respiratory: Negative. Cardiovascular: Negative. Skin: Positive for itching and rash. Visit Vitals  /78 (BP 1 Location: Left arm, BP Patient Position: Sitting)   Pulse (!) 112   Temp 98.5 °F (36.9 °C) (Oral)   Resp 20   Ht 5' (1.524 m)   Wt 145 lb 12.8 oz (66.1 kg)   SpO2 97%   BMI 28.47 kg/m²       Pain Scale: 5/10    Pain Location: Face (and arms)     Physical Exam   Constitutional: She is well-developed, well-nourished, and in no distress. HENT:   Head: Normocephalic. Cardiovascular: Normal rate, regular rhythm and normal heart sounds.    Pulmonary/Chest: Effort normal and breath sounds normal.   Skin: Rash noted. Rash is urticarial.        Swelling and generalized urticaria to bilateral arms and trunk. Tender to touch. ASSESSMENT and PLAN    ICD-10-CM ICD-9-CM    1. Urticaria L50.9 708.9 predniSONE (STERAPRED DS) 10 mg dose pack      lidocaine HCl-hydrocortison ac topical cream   reviewed medications and side effects in detail      Medications Discontinued During This Encounter   Medication Reason    diphenhydrAMINE (BENADRYL ALLERGY) 25 mg tablet Side Effects       Written instructions followed our verbal discussion of all information discussed above, pending tests ordered and future goals/plans. Patient expressed understanding of current diagnosis, planned testing, follow up and if needed to contact the office for any questions or concerns prior to the next visit. Follow-up and Dispositions    · Return in about 1 week (around 8/27/2019), or if symptoms worsen or fail to improve.

## 2019-08-28 ENCOUNTER — OFFICE VISIT (OUTPATIENT)
Dept: FAMILY MEDICINE CLINIC | Age: 64
End: 2019-08-28

## 2019-08-28 ENCOUNTER — HOSPITAL ENCOUNTER (OUTPATIENT)
Dept: LAB | Age: 64
Discharge: HOME | End: 2019-08-28

## 2019-08-28 VITALS
BODY MASS INDEX: 28 KG/M2 | RESPIRATION RATE: 20 BRPM | HEIGHT: 60 IN | WEIGHT: 142.6 LBS | TEMPERATURE: 97.5 F | OXYGEN SATURATION: 98 % | HEART RATE: 107 BPM | DIASTOLIC BLOOD PRESSURE: 77 MMHG | SYSTOLIC BLOOD PRESSURE: 131 MMHG

## 2019-08-28 DIAGNOSIS — R21 RASH: ICD-10-CM

## 2019-08-28 DIAGNOSIS — I10 ESSENTIAL HYPERTENSION: ICD-10-CM

## 2019-08-28 DIAGNOSIS — E11.9 CONTROLLED TYPE 2 DIABETES MELLITUS WITHOUT COMPLICATION, WITHOUT LONG-TERM CURRENT USE OF INSULIN (HCC): Primary | ICD-10-CM

## 2019-08-28 LAB
ALBUMIN SERPL-MCNC: 4 G/DL (ref 3.4–5)
ALBUMIN/GLOB SERPL: 1.3 {RATIO} (ref 0.8–1.7)
ALP SERPL-CCNC: 61 U/L (ref 45–117)
ALT SERPL-CCNC: 25 U/L (ref 13–56)
ANION GAP SERPL CALC-SCNC: 7 MMOL/L (ref 3–18)
AST SERPL-CCNC: 19 U/L (ref 10–38)
BASOPHILS # BLD: 0 K/UL (ref 0–0.1)
BASOPHILS NFR BLD: 0 % (ref 0–2)
BILIRUB SERPL-MCNC: 0.8 MG/DL (ref 0.2–1)
BUN SERPL-MCNC: 18 MG/DL (ref 7–18)
BUN/CREAT SERPL: 16 (ref 12–20)
CALCIUM SERPL-MCNC: 9 MG/DL (ref 8.5–10.1)
CHLORIDE SERPL-SCNC: 101 MMOL/L (ref 100–111)
CHOLEST SERPL-MCNC: 127 MG/DL
CO2 SERPL-SCNC: 32 MMOL/L (ref 21–32)
CREAT SERPL-MCNC: 1.14 MG/DL (ref 0.6–1.3)
DIFFERENTIAL METHOD BLD: ABNORMAL
EOSINOPHIL # BLD: 0.1 K/UL (ref 0–0.4)
EOSINOPHIL NFR BLD: 2 % (ref 0–5)
ERYTHROCYTE [DISTWIDTH] IN BLOOD BY AUTOMATED COUNT: 13.2 % (ref 11.6–14.5)
EST. AVERAGE GLUCOSE BLD GHB EST-MCNC: 140 MG/DL
GLOBULIN SER CALC-MCNC: 3.1 G/DL (ref 2–4)
GLUCOSE SERPL-MCNC: 70 MG/DL (ref 74–99)
HBA1C MFR BLD: 6.5 % (ref 4.2–5.6)
HCT VFR BLD AUTO: 37.3 % (ref 35–45)
HDLC SERPL-MCNC: 54 MG/DL (ref 40–60)
HDLC SERPL: 2.4 {RATIO} (ref 0–5)
HGB BLD-MCNC: 11.6 G/DL (ref 12–16)
LDLC SERPL CALC-MCNC: 51.6 MG/DL (ref 0–100)
LIPID PROFILE,FLP: NORMAL
LYMPHOCYTES # BLD: 1.5 K/UL (ref 0.9–3.6)
LYMPHOCYTES NFR BLD: 29 % (ref 21–52)
MCH RBC QN AUTO: 27.5 PG (ref 24–34)
MCHC RBC AUTO-ENTMCNC: 31.1 G/DL (ref 31–37)
MCV RBC AUTO: 88.4 FL (ref 74–97)
MONOCYTES # BLD: 0.5 K/UL (ref 0.05–1.2)
MONOCYTES NFR BLD: 10 % (ref 3–10)
NEUTS SEG # BLD: 3.1 K/UL (ref 1.8–8)
NEUTS SEG NFR BLD: 59 % (ref 40–73)
PLATELET # BLD AUTO: 308 K/UL (ref 135–420)
PMV BLD AUTO: 9.8 FL (ref 9.2–11.8)
POTASSIUM SERPL-SCNC: 3.5 MMOL/L (ref 3.5–5.5)
PROT SERPL-MCNC: 7.1 G/DL (ref 6.4–8.2)
RBC # BLD AUTO: 4.22 M/UL (ref 4.2–5.3)
SODIUM SERPL-SCNC: 140 MMOL/L (ref 136–145)
TRIGL SERPL-MCNC: 107 MG/DL (ref ?–150)
VLDLC SERPL CALC-MCNC: 21.4 MG/DL
WBC # BLD AUTO: 5.4 K/UL (ref 4.6–13.2)

## 2019-08-28 PROCEDURE — 80061 LIPID PANEL: CPT

## 2019-08-28 PROCEDURE — 85025 COMPLETE CBC W/AUTO DIFF WBC: CPT

## 2019-08-28 PROCEDURE — 83036 HEMOGLOBIN GLYCOSYLATED A1C: CPT

## 2019-08-28 PROCEDURE — 80053 COMPREHEN METABOLIC PANEL: CPT

## 2019-08-28 NOTE — PROGRESS NOTES
08/28/19    PCP: Gomez Sandra NP    Chief Complaint   Patient presents with    Follow Up Chronic Condition        HISTORY OF PRESENT ILLNESS  Yanni Pisano  is a 58 y.o. female whom presents for Follow Up Chronic Condition    No new complaints today. Rash from previous visit is gone and patient reports that she is doing well. HPI  Diabetes Mellitus:  She has diabetes mellitus, and  hypertension and hyperlipidemia. Diabetic ROS - medication compliance: compliant all of the time, diabetic diet compliance: compliant all of the time, home glucose monitoring: is performed regularly. Lab review: orders written for new lab studies as appropriate; see orders. Patient Active Problem List    Diagnosis Date Noted    Essential hypertension 07/18/2018    Type 2 diabetes mellitus without complication, without long-term current use of insulin ([facility] 07/18/2018     Current Outpatient Medications   Medication Sig Dispense Refill    lidocaine HCl-hydrocortison ac topical cream Apply  to affected area two (2) times a day. 85 g 0    rosuvastatin (CRESTOR) 5 mg tablet Take 1 Tab by mouth nightly. 90 Tab 3    Blood-Glucose Meter monitoring kit Use as directed to check blood glucose levels daily 1 Kit 0    glucose blood VI test strips (BLOOD GLUCOSE TEST) strip Use as directed to check blood glucose daily 50 Strip 11    lancets misc Use as directed to check blood glucose levels 1 Each 11    glipiZIDE (GLUCOTROL) 5 mg tablet TAKE 1 TABLET BY MOUTH THREE TIMES DAILY BEFORE  BREAKFAST,  LUNCH  AND  DINNER 90 Tab 6    metFORMIN (GLUCOPHAGE) 500 mg tablet Take 1 Tab by mouth three (3) times daily (with meals). 60 Tab 6    aspirin (ALMAZ CHEWABLE ASPIRIN) 81 mg chewable tablet Take 81 mg by mouth daily.  indapamide (LOZOL) 1.25 mg tablet Take 1 Tab by mouth daily.  30 Tab 6    metFORMIN (GLUCOPHAGE) 500 mg tablet TAKE 1 TABLET BY MOUTH THREE TIMES DAILY WITH MEALS 60 Tab 6     Allergies   Allergen Reactions    Benadryl [Diphenhydramine Hcl] Rash     Hives      Claritin [Loratadine] Rash    Lisinopril Rash    Vistaril [Hydroxyzine Pamoate] Rash     Past Medical History:   Diagnosis Date    Arthritis     Diabetes [facility]     Diabetic eye exam ([facility] 10/08/2018    Bilateral cataracts   No retinopathy    Hypertension      Past Surgical History:   Procedure Laterality Date    HX HYSTERECTOMY  1980    HX TONSILLECTOMY      as a child     Family History   Problem Relation Age of Onset    Breast Cancer Mother     Diabetes Mother     Hypertension Father     Thyroid Cancer Sister     Hypertension Brother     Cancer Maternal Grandmother      Social History     Tobacco Use    Smoking status: Former Smoker    Smokeless tobacco: Never Used   Substance Use Topics    Alcohol use: Yes     Comment: occasionally on holidays       Review of Systems   Constitutional: Negative. HENT: Negative. Eyes: Negative. Respiratory: Negative. Cardiovascular: Negative. Gastrointestinal: Negative. Genitourinary: Negative. Musculoskeletal: Negative. Skin: Negative. Neurological: Negative. Endo/Heme/Allergies: Negative. Psychiatric/Behavioral: Negative. Visit Vitals  /77 (BP 1 Location: Left arm, BP Patient Position: Sitting)   Pulse (!) 107   Temp 97.5 °F (36.4 °C) (Oral)   Resp 20   Ht 5' (1.524 m)   Wt 142 lb 9.6 oz (64.7 kg)   SpO2 98%   BMI 27.85 kg/m²       Pain Scale: 0 - No pain/10    Pain Location:      Physical Exam   Constitutional: She is oriented to person, place, and time and well-developed, well-nourished, and in no distress. HENT:   Head: Normocephalic. Eyes: Pupils are equal, round, and reactive to light. Neck: Normal range of motion. Cardiovascular: Normal rate, regular rhythm and normal heart sounds. Pulmonary/Chest: Effort normal and breath sounds normal.   Abdominal: Soft. Bowel sounds are normal.   Musculoskeletal: Normal range of motion. Neurological: She is alert and oriented to person, place, and time. Skin: Skin is warm and dry. Psychiatric: Affect normal.       Lab Results   Component Value Date/Time    WBC 4.9 10/06/2018 06:16 AM    HGB 12.2 10/06/2018 06:16 AM    HCT 37.3 10/06/2018 06:16 AM    PLATELET 060 63/43/5265 06:16 AM    MCV 86.9 10/06/2018 06:16 AM     Lab Results   Component Value Date/Time    Hemoglobin A1c 5.7 (H) 05/16/2019 12:00 AM    Hemoglobin A1c 5.5 03/22/2019 08:44 AM    Hemoglobin A1c 5.4 01/14/2019 07:44 AM    Glucose 108 (H) 05/16/2019 12:00 AM    Microalbumin/Creat ratio (mg/g creat) 11 09/27/2018 09:27 AM    Microalbumin,urine random 1.31 09/27/2018 09:27 AM    LDL, calculated 108.8 (H) 07/18/2018 01:08 PM    Creatinine 0.75 05/16/2019 12:00 AM      Lab Results   Component Value Date/Time    Cholesterol, total 184 07/18/2018 01:08 PM    HDL Cholesterol 38 (L) 07/18/2018 01:08 PM    LDL, calculated 108.8 (H) 07/18/2018 01:08 PM    Triglyceride 186 (H) 07/18/2018 01:08 PM    CHOL/HDL Ratio 4.8 07/18/2018 01:08 PM      Pertinent Radiology reports:     ASSESSMENT and PLAN    ICD-10-CM ICD-9-CM    1. Controlled type 2 diabetes mellitus without complication, without long-term current use of insulin ([facility]) E11.9 250.00 CBC WITH AUTOMATED DIFF      HEMOGLOBIN A1C WITH EAG      LIPID PANEL      METABOLIC PANEL, COMPREHENSIVE      COLLECTION VENOUS BLOOD,VENIPUNCTURE      REFERRAL TO PRIMARY CARE    Statin therapy added to regimen    2. Essential hypertension I10 401.9 CBC WITH AUTOMATED DIFF      LIPID PANEL      METABOLIC PANEL, COMPREHENSIVE      COLLECTION VENOUS BLOOD,VENIPUNCTURE      REFERRAL TO PRIMARY CARE   3. Rash R21 782.1     resolved. Medications Discontinued During This Encounter   Medication Reason    predniSONE (STERAPRED DS) 10 mg dose pack Therapy Completed     Patient provided list for new providers as she is now insured.   Written instructions followed our verbal discussion of all information discussed above, pending tests ordered and future goals/plans. Patient expressed understanding of current diagnosis, planned testing, follow up and if needed to contact the office for any questions or concerns prior to the next visit.

## 2019-08-28 NOTE — PROGRESS NOTES
Verified patient name, , demographics and orders. Venipuncture for labs performed using 21G x 3/4\" butterfly Right AC using aseptic technique. Skin intact and dry. No active bleeding or complications noted. Bandaid dressing applied.

## 2019-10-15 DIAGNOSIS — E11.9 TYPE 2 DIABETES MELLITUS WITHOUT COMPLICATION, WITHOUT LONG-TERM CURRENT USE OF INSULIN (HCC): ICD-10-CM

## 2019-10-17 RX ORDER — GLIPIZIDE 5 MG/1
TABLET ORAL
Qty: 90 TAB | Refills: 3 | Status: SHIPPED | OUTPATIENT
Start: 2019-10-17

## 2019-12-15 ENCOUNTER — HOSPITAL ENCOUNTER (EMERGENCY)
Age: 64
Discharge: HOME | End: 2019-12-15
Attending: EMERGENCY MEDICINE
Payer: MEDICARE

## 2019-12-15 VITALS
TEMPERATURE: 98 F | DIASTOLIC BLOOD PRESSURE: 82 MMHG | RESPIRATION RATE: 16 BRPM | SYSTOLIC BLOOD PRESSURE: 152 MMHG | HEART RATE: 85 BPM | OXYGEN SATURATION: 97 %

## 2019-12-15 DIAGNOSIS — R42 VERTIGO: Primary | ICD-10-CM

## 2019-12-15 LAB
ALBUMIN SERPL-MCNC: 3.6 G/DL (ref 3.4–5)
ALBUMIN/GLOB SERPL: 1.1 {RATIO} (ref 0.8–1.7)
ALP SERPL-CCNC: 65 U/L (ref 45–117)
ALT SERPL-CCNC: 23 U/L (ref 13–56)
ANION GAP SERPL CALC-SCNC: 8 MMOL/L (ref 3–18)
AST SERPL-CCNC: 17 U/L (ref 10–38)
ATRIAL RATE: 84 BPM
BASOPHILS # BLD: 0 K/UL (ref 0–0.1)
BASOPHILS NFR BLD: 0 % (ref 0–2)
BILIRUB SERPL-MCNC: 0.2 MG/DL (ref 0.2–1)
BUN SERPL-MCNC: 21 MG/DL (ref 7–18)
BUN/CREAT SERPL: 23 (ref 12–20)
CALCIUM SERPL-MCNC: 8.7 MG/DL (ref 8.5–10.1)
CALCULATED P AXIS, ECG09: 48 DEGREES
CALCULATED R AXIS, ECG10: 32 DEGREES
CALCULATED T AXIS, ECG11: 50 DEGREES
CHLORIDE SERPL-SCNC: 103 MMOL/L (ref 100–111)
CK MB CFR SERPL CALC: 1.6 % (ref 0–4)
CK MB SERPL-MCNC: 4.1 NG/ML (ref 5–25)
CK SERPL-CCNC: 261 U/L (ref 26–192)
CO2 SERPL-SCNC: 29 MMOL/L (ref 21–32)
CREAT SERPL-MCNC: 0.93 MG/DL (ref 0.6–1.3)
DIAGNOSIS, 93000: NORMAL
DIFFERENTIAL METHOD BLD: ABNORMAL
EOSINOPHIL # BLD: 0.1 K/UL (ref 0–0.4)
EOSINOPHIL NFR BLD: 3 % (ref 0–5)
ERYTHROCYTE [DISTWIDTH] IN BLOOD BY AUTOMATED COUNT: 13.3 % (ref 11.6–14.5)
GLOBULIN SER CALC-MCNC: 3.3 G/DL (ref 2–4)
GLUCOSE SERPL-MCNC: 150 MG/DL (ref 74–99)
HCT VFR BLD AUTO: 32.2 % (ref 35–45)
HGB BLD-MCNC: 10.4 G/DL (ref 12–16)
LYMPHOCYTES # BLD: 1.2 K/UL (ref 0.9–3.6)
LYMPHOCYTES NFR BLD: 21 % (ref 21–52)
MCH RBC QN AUTO: 27.3 PG (ref 24–34)
MCHC RBC AUTO-ENTMCNC: 32.3 G/DL (ref 31–37)
MCV RBC AUTO: 84.5 FL (ref 74–97)
MONOCYTES # BLD: 0.7 K/UL (ref 0.05–1.2)
MONOCYTES NFR BLD: 13 % (ref 3–10)
NEUTS SEG # BLD: 3.5 K/UL (ref 1.8–8)
NEUTS SEG NFR BLD: 63 % (ref 40–73)
P-R INTERVAL, ECG05: 150 MS
PLATELET # BLD AUTO: 234 K/UL (ref 135–420)
PMV BLD AUTO: 10 FL (ref 9.2–11.8)
POTASSIUM SERPL-SCNC: 3.4 MMOL/L (ref 3.5–5.5)
PROT SERPL-MCNC: 6.9 G/DL (ref 6.4–8.2)
Q-T INTERVAL, ECG07: 384 MS
QRS DURATION, ECG06: 88 MS
QTC CALCULATION (BEZET), ECG08: 453 MS
RBC # BLD AUTO: 3.81 M/UL (ref 4.2–5.3)
SODIUM SERPL-SCNC: 140 MMOL/L (ref 136–145)
TROPONIN I SERPL-MCNC: <0.02 NG/ML (ref 0–0.04)
VENTRICULAR RATE, ECG03: 84 BPM
WBC # BLD AUTO: 5.5 K/UL (ref 4.6–13.2)

## 2019-12-15 PROCEDURE — 93005 ELECTROCARDIOGRAM TRACING: CPT

## 2019-12-15 PROCEDURE — 82550 ASSAY OF CK (CPK): CPT

## 2019-12-15 PROCEDURE — 85025 COMPLETE CBC W/AUTO DIFF WBC: CPT

## 2019-12-15 PROCEDURE — 80053 COMPREHEN METABOLIC PANEL: CPT

## 2019-12-15 PROCEDURE — 74011250636 HC RX REV CODE- 250/636: Performed by: STUDENT IN AN ORGANIZED HEALTH CARE EDUCATION/TRAINING PROGRAM

## 2019-12-15 PROCEDURE — 99282 EMERGENCY DEPT VISIT SF MDM: CPT

## 2019-12-15 PROCEDURE — 74011250637 HC RX REV CODE- 250/637: Performed by: STUDENT IN AN ORGANIZED HEALTH CARE EDUCATION/TRAINING PROGRAM

## 2019-12-15 RX ORDER — MECLIZINE HYDROCHLORIDE 25 MG/1
25 TABLET ORAL
Qty: 30 TAB | Refills: 1 | Status: SHIPPED | OUTPATIENT
Start: 2019-12-15 | End: 2020-01-04

## 2019-12-15 RX ORDER — MECLIZINE HCL 12.5 MG 12.5 MG/1
25 TABLET ORAL
Status: COMPLETED | OUTPATIENT
Start: 2019-12-15 | End: 2019-12-15

## 2019-12-15 RX ORDER — POTASSIUM CHLORIDE 20 MEQ/1
40 TABLET, EXTENDED RELEASE ORAL
Status: COMPLETED | OUTPATIENT
Start: 2019-12-15 | End: 2019-12-15

## 2019-12-15 RX ADMIN — POTASSIUM CHLORIDE 40 MEQ: 1500 TABLET, EXTENDED RELEASE ORAL at 04:46

## 2019-12-15 RX ADMIN — MECLIZINE 25 MG: 12.5 TABLET ORAL at 04:47

## 2019-12-15 NOTE — ED PROVIDER NOTES
EMERGENCY DEPARTMENT HISTORY AND PHYSICAL EXAM    3:39 AM      Date: 12/15/2019  Patient Name: Lea Moscoso    History of Presenting Illness     No chief complaint on file. History Provided By: Patient  Lea Moscoso is a 71year old female with a history of DM and HTN who presents with vertigo. Patient got up from bed to use the restroom and when she laid back down in bed she felt the room spinning. She complains of nausea and frontal headache. Denies vomiting, diarrhea, constipation, fever and chills. She reports that this has never happened to her before. PCP: None    Current Outpatient Medications   Medication Sig Dispense Refill    meclizine (ANTIVERT) 25 mg tablet Take 1 Tab by mouth three (3) times daily as needed for Dizziness or Nausea for up to 20 days. 30 Tab 1    glipiZIDE (GLUCOTROL) 5 mg tablet TAKE 1 TABLET BY MOUTH THREE TIMES DAILY BEFORE BREAKFAST, LUNCH, AND DINNER 90 Tab 3    lidocaine HCl-hydrocortison ac topical cream Apply  to affected area two (2) times a day. 85 g 0    indapamide (LOZOL) 1.25 mg tablet Take 1 Tab by mouth daily. 30 Tab 6    metFORMIN (GLUCOPHAGE) 500 mg tablet TAKE 1 TABLET BY MOUTH THREE TIMES DAILY WITH MEALS 60 Tab 6    rosuvastatin (CRESTOR) 5 mg tablet Take 1 Tab by mouth nightly. 90 Tab 3    Blood-Glucose Meter monitoring kit Use as directed to check blood glucose levels daily 1 Kit 0    glucose blood VI test strips (BLOOD GLUCOSE TEST) strip Use as directed to check blood glucose daily 50 Strip 11    lancets misc Use as directed to check blood glucose levels 1 Each 11    glipiZIDE (GLUCOTROL) 5 mg tablet TAKE 1 TABLET BY MOUTH THREE TIMES DAILY BEFORE  BREAKFAST,  LUNCH  AND  DINNER 90 Tab 6    metFORMIN (GLUCOPHAGE) 500 mg tablet Take 1 Tab by mouth three (3) times daily (with meals). 60 Tab 6    aspirin (ALMAZ CHEWABLE ASPIRIN) 81 mg chewable tablet Take 81 mg by mouth daily.          Past History     Past Medical History:  Past Medical History:   Diagnosis Date    Arthritis     Diabetes ([facility]     Diabetic eye exam ([facility] 10/08/2018    Bilateral cataracts   No retinopathy    Hypertension        Past Surgical History:  Past Surgical History:   Procedure Laterality Date    HX HYSTERECTOMY  1980    HX TONSILLECTOMY      as a child       Family History:  Family History   Problem Relation Age of Onset   [facility] Breast Cancer Mother     Diabetes Mother     Hypertension Father     Thyroid Cancer Sister     Hypertension Brother     Cancer Maternal Grandmother        Social History:  Social History     Tobacco Use    Smoking status: Former Smoker    Smokeless tobacco: Never Used   Substance Use Topics    Alcohol use: Yes     Comment: occasionally on holidays    Drug use: No       Allergies: Allergies   Allergen Reactions    Benadryl [Diphenhydramine Hcl] Rash     Hives      Claritin [Loratadine] Rash    Lisinopril Rash    Vistaril [Hydroxyzine Pamoate] Rash         Review of Systems     Review of Systems   Gastrointestinal: Positive for nausea. Neurological: Positive for dizziness and headaches. All other systems reviewed and are negative. Physical Exam     Visit Vitals  LMP 01/01/1980         Physical Exam  Constitutional:       Appearance: Normal appearance. HENT:      Head: Normocephalic and atraumatic. Right Ear: Tympanic membrane, ear canal and external ear normal.      Left Ear: Tympanic membrane, ear canal and external ear normal.      Nose: Nose normal.      Mouth/Throat:      Mouth: Mucous membranes are moist.      Pharynx: Oropharynx is clear. Eyes:      Extraocular Movements: Extraocular movements intact. Conjunctiva/sclera: Conjunctivae normal.   Neck:      Musculoskeletal: Normal range of motion and neck supple. Cardiovascular:      Rate and Rhythm: Normal rate and regular rhythm. Pulses: Normal pulses. Heart sounds: Normal heart sounds.    Pulmonary:      Effort: Pulmonary effort is normal. Breath sounds: Normal breath sounds. Abdominal:      General: Abdomen is flat. Bowel sounds are normal.      Palpations: Abdomen is soft. Musculoskeletal: Normal range of motion. General: No signs of injury. Skin:     General: Skin is warm and dry. Neurological:      General: No focal deficit present. Mental Status: She is alert and oriented to person, place, and time. Cranial Nerves: No cranial nerve deficit. Sensory: No sensory deficit. Motor: No weakness. Coordination: Coordination normal.      Gait: Gait normal.      Comments: Finger to nose, Dysdiadochokinesia and Rapid alternating movements were normal  Observed patient safely walking to and from bed during ED visit           Diagnostic Study Results     Labs -  Recent Results (from the past 12 hour(s))   EKG, 12 LEAD, INITIAL    Collection Time: 12/15/19  3:14 AM   Result Value Ref Range    Ventricular Rate 84 BPM    Atrial Rate 84 BPM    P-R Interval 150 ms    QRS Duration 88 ms    Q-T Interval 384 ms    QTC Calculation (Bezet) 453 ms    Calculated P Axis 48 degrees    Calculated R Axis 32 degrees    Calculated T Axis 50 degrees    Diagnosis       Normal sinus rhythm  Normal ECG  When compared with ECG of 06-OCT-2018 06:06,  No significant change was found     CBC WITH AUTOMATED DIFF    Collection Time: 12/15/19  3:25 AM   Result Value Ref Range    WBC 5.5 4.6 - 13.2 K/uL    RBC 3.81 (L) 4.20 - 5.30 M/uL    HGB 10.4 (L) 12.0 - 16.0 g/dL    HCT 32.2 (L) 35.0 - 45.0 %    MCV 84.5 74.0 - 97.0 FL    MCH 27.3 24.0 - 34.0 PG    MCHC 32.3 31.0 - 37.0 g/dL    RDW 13.3 11.6 - 14.5 %    PLATELET 563 403 - 316 K/uL    MPV 10.0 9.2 - 11.8 FL    NEUTROPHILS 63 40 - 73 %    LYMPHOCYTES 21 21 - 52 %    MONOCYTES 13 (H) 3 - 10 %    EOSINOPHILS 3 0 - 5 %    BASOPHILS 0 0 - 2 %    ABS. NEUTROPHILS 3.5 1.8 - 8.0 K/UL    ABS. LYMPHOCYTES 1.2 0.9 - 3.6 K/UL    ABS. MONOCYTES 0.7 0.05 - 1.2 K/UL    ABS.  EOSINOPHILS 0.1 0.0 - 0.4 K/UL ABS. BASOPHILS 0.0 0.0 - 0.1 K/UL    DF AUTOMATED     CARDIAC PANEL,(CK, CKMB & TROPONIN)    Collection Time: 12/15/19  3:25 AM   Result Value Ref Range     (H) 26 - 192 U/L    CK - MB 4.1 (H) <3.6 ng/ml    CK-MB Index 1.6 0.0 - 4.0 %    Troponin-I, QT <0.02 0.0 - 2.271 NG/ML   METABOLIC PANEL, COMPREHENSIVE    Collection Time: 12/15/19  3:25 AM   Result Value Ref Range    Sodium 140 136 - 145 mmol/L    Potassium 3.4 (L) 3.5 - 5.5 mmol/L    Chloride 103 100 - 111 mmol/L    CO2 29 21 - 32 mmol/L    Anion gap 8 3.0 - 18 mmol/L    Glucose 150 (H) 74 - 99 mg/dL    BUN 21 (H) 7.0 - 18 MG/DL    Creatinine 0.93 0.6 - 1.3 MG/DL    BUN/Creatinine ratio 23 (H) 12 - 20      GFR est AA >60 >60 ml/min/1.73m2    GFR est non-AA >60 >60 ml/min/1.73m2    Calcium 8.7 8.5 - 10.1 MG/DL    Bilirubin, total 0.2 0.2 - 1.0 MG/DL    ALT (SGPT) 23 13 - 56 U/L    AST (SGOT) 17 10 - 38 U/L    Alk. phosphatase 65 45 - 117 U/L    Protein, total 6.9 6.4 - 8.2 g/dL    Albumin 3.6 3.4 - 5.0 g/dL    Globulin 3.3 2.0 - 4.0 g/dL    A-G Ratio 1.1 0.8 - 1.7         Radiologic Studies -   No orders to display         Medical Decision Making   I am the first provider for this patient. I reviewed the vital signs, available nursing notes, past medical history, past surgical history, family history and social history. Vital Signs-Reviewed the patient's vital signs. EKG: NSR at 84 bpm    Records Reviewed: Nursing Notes and Old Medical Records (Time of Review: 3:39 AM)    ED Course: Progress Notes, Reevaluation, and Consults:  CBC wnl. CMP wnl, except hypokalemia of 3.4. Troponin wnl. EKG showed NSR at 84 bpm.    Test: CBC, CMP, Cardiac panel, EKG    Provider Notes (Medical Decision Making):   MDM  DDX: BPV vs Vasovagal vs Arrhythmia vs Electrolyte abnormality vs labyrinthitis     Patient most likely has peripheral vertigo. She had normal finger to nose, rapid alternating movements and dysdiadochokinesia.  Patient was able to walk to and from bed several times during ED evaluation. Low suspicion for central cause of vertigo or cerebellar stroke given normal neurological exam and improvement of symptoms with meclizine. Discussed results, treatment and plan with patient who expressed understanding and agreement. Recommend outpatient follow up with PCP. Return precautions given. Given meclizine rx. Diagnosis     Clinical Impression:   1. Vertigo        Disposition: Home    Follow-up Information     Follow up With Specialties Details Why [facility]    [facility] EMERGENCY DEPT Emergency Medicine  If symptoms worsen [address] [address]  [address] [place] [address] FAMILY MEDICINE  Schedule an appointment as soon as possible for a visit for ED follow up [address] [facility]  [phone]           Patient's Medications   Start Taking    MECLIZINE (ANTIVERT) 25 MG TABLET    Take 1 Tab by mouth three (3) times daily as needed for Dizziness or Nausea for up to 20 days. Continue Taking    ASPIRIN (ALMAZ CHEWABLE ASPIRIN) 81 MG CHEWABLE TABLET    Take 81 mg by mouth daily. BLOOD-GLUCOSE METER MONITORING KIT    Use as directed to check blood glucose levels daily    GLIPIZIDE (GLUCOTROL) 5 MG TABLET    TAKE 1 TABLET BY MOUTH THREE TIMES DAILY BEFORE  BREAKFAST,  LUNCH  AND  DINNER    GLIPIZIDE (GLUCOTROL) 5 MG TABLET    TAKE 1 TABLET BY MOUTH THREE TIMES DAILY BEFORE BREAKFAST, LUNCH, AND DINNER    GLUCOSE BLOOD VI TEST STRIPS (BLOOD GLUCOSE TEST) STRIP    Use as directed to check blood glucose daily    INDAPAMIDE (LOZOL) 1.25 MG TABLET    Take 1 Tab by mouth daily. LANCETS MISC    Use as directed to check blood glucose levels    LIDOCAINE HCL-HYDROCORTISON AC TOPICAL CREAM    Apply  to affected area two (2) times a day. METFORMIN (GLUCOPHAGE) 500 MG TABLET    Take 1 Tab by mouth three (3) times daily (with meals).     METFORMIN (GLUCOPHAGE) 500 MG TABLET    TAKE 1 TABLET BY MOUTH THREE TIMES DAILY WITH MEALS ROSUVASTATIN (CRESTOR) 5 MG TABLET    Take 1 Tab by mouth nightly. These Medications have changed    No medications on file   Stop Taking    No medications on file     Disclaimer: Sections of this note are dictated using utilizing voice recognition software. Minor typographical errors may be present. If questions arise, please do not hesitate to contact me or call our department.

## 2019-12-15 NOTE — DISCHARGE INSTRUCTIONS
Patient Education        Epley Maneuver at Home for Vertigo: Exercises  Introduction  Vertigo is a spinning or whirling sensation when you move your head. Your doctor may have moved you in different positions to help your vertigo get better faster. This is called the Epley maneuver. Your doctor also may have asked you to do these exercises at home. Do the exercises as often as your doctor recommends. If your vertigo is getting worse, your doctor may have you change the exercise or stop it. Step 1  Step 1    1. Sit on the edge of a bed or sofa. Step 2    1. Turn your head 45 degrees in the direction your doctor told you to. This should be toward the ear that causes the most vertigo for you. In this picture, the woman is turning toward her left ear. Step 3    1. Tilt yourself backward until you are lying on your back. Your head should still be at a 45-degree turn. Your head should be about midway between looking straight ahead and looking out to your side. Hold for 30 seconds. If you have vertigo, stay in this position until it stops. Step 4    1. Turn your head 90 degrees toward the ear that has the least vertigo. In this picture, the woman is turning to the right because she has vertigo on her left side. The point of your chin should be raised and over your shoulder. Hold for 30 seconds. Step 5    1. Roll onto the side with the least vertigo. You should now be looking at the floor. Hold for 30 seconds. Follow-up care is a key part of your treatment and safety. Be sure to make and go to all appointments, and call your doctor if you are having problems. It's also a good idea to know your test results and keep a list of the medicines you take. Where can you learn more? Go to http://libia-salas.info/. Enter 470 6054 in the search box to learn more about \"Epley Maneuver at Home for Vertigo: Exercises. \"  Current as of: March 28, 2019  Content Version: 12.2  © 2303-3126 [employer]. Care instructions adapted under license by [employer] (which disclaims liability or warranty for this information). If you have questions about a medical condition or this instruction, always ask your healthcare professional. [address] any warranty or liability for your use of this information. Patient Education        Vertigo: Care Instructions  Your Care Instructions    Vertigo is the feeling that you or your surroundings are moving when there is no actual movement. It is often described as a feeling of spinning, whirling, falling, or tilting. Vertigo may make you vomit or feel nauseated. You may have trouble standing or walking and may lose your balance. Vertigo is often related to an inner ear problem, but it can have other more serious causes. If vertigo continues, you may need more tests to find its cause. Follow-up care is a key part of your treatment and safety. Be sure to make and go to all appointments, and call your doctor if you are having problems. It's also a good idea to know your test results and keep a list of the medicines you take. How can you care for yourself at home? · Do not lie flat on your back. Prop yourself up slightly. This may reduce the spinning feeling. Keep your eyes open. · Move slowly so that you do not fall. · If your doctor recommends medicine, take it exactly as directed. · Do not drive while you are having vertigo. Certain exercises, called Watkins-Daroff exercises, can help decrease vertigo. To do Watkins-Daroff exercises:  · Sit on the edge of a bed or sofa and quickly lie down on the side that causes the worst vertigo. Lie on your side with your ear down. · Stay in this position for at least 30 seconds or until the vertigo goes away. · Sit up. If this causes vertigo, wait for it to stop. · Repeat the procedure on the other side. · Repeat this 10 times.  Do these exercises 2 times a day until the vertigo is gone.  When should you call for help? Call 911 anytime you think you may need emergency care. For example, call if:    · You passed out (lost consciousness).     · You have symptoms of a stroke. These may include:  ? Sudden numbness, tingling, weakness, or loss of movement in your face, arm, or leg, especially on only one side of your body. ? Sudden vision changes. ? Sudden trouble speaking. ? Sudden confusion or trouble understanding simple statements. ? Sudden problems with walking or balance. ? A sudden, severe headache that is different from past headaches.    Call your doctor now or seek immediate medical care if:    · Vertigo occurs with a fever, a headache, or ringing in your ears.     · You have new or increased nausea and vomiting.    Watch closely for changes in your health, and be sure to contact your doctor if:    · Vertigo gets worse or happens more often.     · Vertigo has not gotten better after 2 weeks. Where can you learn more? Go to http://libia-salas.info/. Enter D735 in the search box to learn more about \"Vertigo: Care Instructions. \"  Current as of: October 21, 2018  Content Version: 12.2  © 4131-1733 [employer]. Care instructions adapted under license by [employer] (which disclaims liability or warranty for this information). If you have questions about a medical condition or this instruction, always ask your healthcare professional. [place] [address] any warranty or liability for your use of this information.

## 2020-06-10 ENCOUNTER — HOSPITAL ENCOUNTER (OUTPATIENT)
Dept: MAMMOGRAPHY | Age: 65
Discharge: HOME | End: 2020-06-10
Attending: FAMILY MEDICINE
Payer: MEDICARE

## 2020-06-10 DIAGNOSIS — Z12.31 VISIT FOR SCREENING MAMMOGRAM: ICD-10-CM

## 2020-06-10 PROCEDURE — 77063 BREAST TOMOSYNTHESIS BI: CPT

## 2021-06-07 ENCOUNTER — TRANSCRIBE ORDER (OUTPATIENT)
Dept: SCHEDULING | Age: 66
End: 2021-06-07

## 2021-06-07 DIAGNOSIS — Z12.31 SCREENING MAMMOGRAM FOR HIGH-RISK PATIENT: Primary | ICD-10-CM

## 2021-06-23 ENCOUNTER — HOSPITAL ENCOUNTER (OUTPATIENT)
Dept: MAMMOGRAPHY | Age: 66
Discharge: HOME | End: 2021-06-23
Attending: FAMILY MEDICINE
Payer: MEDICARE

## 2021-06-23 DIAGNOSIS — Z12.31 SCREENING MAMMOGRAM FOR HIGH-RISK PATIENT: ICD-10-CM

## 2021-06-23 PROCEDURE — 77063 BREAST TOMOSYNTHESIS BI: CPT

## 2022-03-19 PROBLEM — I10 ESSENTIAL HYPERTENSION: Status: ACTIVE | Noted: 2018-07-18

## 2022-03-20 PROBLEM — E11.9 TYPE 2 DIABETES MELLITUS WITHOUT COMPLICATION, WITHOUT LONG-TERM CURRENT USE OF INSULIN (HCC): Status: ACTIVE | Noted: 2018-07-18

## 2022-03-27 ENCOUNTER — TRANSCRIBE ORDER (OUTPATIENT)
Dept: SCHEDULING | Age: 67
End: 2022-03-27

## 2022-03-27 DIAGNOSIS — Z12.31 SCREENING MAMMOGRAM, ENCOUNTER FOR: Primary | ICD-10-CM

## 2022-06-27 ENCOUNTER — HOSPITAL ENCOUNTER (OUTPATIENT)
Dept: MAMMOGRAPHY | Age: 67
Discharge: HOME | End: 2022-06-27
Attending: FAMILY MEDICINE
Payer: MEDICARE

## 2022-06-27 DIAGNOSIS — Z12.31 SCREENING MAMMOGRAM, ENCOUNTER FOR: ICD-10-CM

## 2022-06-27 PROCEDURE — 77063 BREAST TOMOSYNTHESIS BI: CPT

## 2023-03-29 ENCOUNTER — HOSPITAL ENCOUNTER (OUTPATIENT)
Facility: HOSPITAL | Age: 68
Discharge: HOME | End: 2023-04-01
Payer: MEDICARE

## 2023-03-29 DIAGNOSIS — Z78.0 POST-MENOPAUSE: ICD-10-CM

## 2023-03-29 PROCEDURE — 77080 DXA BONE DENSITY AXIAL: CPT

## 2023-06-05 ENCOUNTER — TRANSCRIBE ORDERS (OUTPATIENT)
Facility: HOSPITAL | Age: 68
End: 2023-06-05

## 2023-06-05 DIAGNOSIS — Z12.31 VISIT FOR SCREENING MAMMOGRAM: Primary | ICD-10-CM

## 2023-06-29 ENCOUNTER — HOSPITAL ENCOUNTER (OUTPATIENT)
Facility: HOSPITAL | Age: 68
Discharge: HOME | End: 2023-06-29
Payer: MEDICARE

## 2023-06-29 DIAGNOSIS — Z12.31 VISIT FOR SCREENING MAMMOGRAM: ICD-10-CM

## 2023-06-29 PROCEDURE — 77063 BREAST TOMOSYNTHESIS BI: CPT

## 2023-08-02 ENCOUNTER — HOSPITAL ENCOUNTER (OUTPATIENT)
Facility: HOSPITAL | Age: 68
Discharge: HOME | End: 2023-08-05
Payer: MEDICARE

## 2023-08-02 DIAGNOSIS — R92.8 ABNORMAL MAMMOGRAM: ICD-10-CM

## 2023-08-02 PROCEDURE — 76642 ULTRASOUND BREAST LIMITED: CPT

## 2023-08-02 PROCEDURE — G0279 TOMOSYNTHESIS, MAMMO: HCPCS

## 2023-08-14 ENCOUNTER — HOSPITAL ENCOUNTER (OUTPATIENT)
Facility: HOSPITAL | Age: 68
Discharge: HOME | End: 2023-08-17
Payer: MEDICARE

## 2023-08-14 DIAGNOSIS — R92.8 ABNORMAL MAMMOGRAM: ICD-10-CM

## 2023-08-14 DIAGNOSIS — N63.10 MASS OF RIGHT BREAST, UNSPECIFIED QUADRANT: ICD-10-CM

## 2023-08-14 PROCEDURE — 88342 IMHCHEM/IMCYTCHM 1ST ANTB: CPT

## 2023-08-14 PROCEDURE — 77065 DX MAMMO INCL CAD UNI: CPT

## 2023-08-14 PROCEDURE — 19083 BX BREAST 1ST LESION US IMAG: CPT

## 2023-08-14 PROCEDURE — 2500000003 HC RX 250 WO HCPCS: Performed by: FAMILY MEDICINE

## 2023-08-14 PROCEDURE — 88305 TISSUE EXAM BY PATHOLOGIST: CPT

## 2023-08-14 PROCEDURE — 88341 IMHCHEM/IMCYTCHM EA ADD ANTB: CPT

## 2023-08-14 PROCEDURE — 88360 TUMOR IMMUNOHISTOCHEM/MANUAL: CPT

## 2023-08-14 RX ORDER — LIDOCAINE HYDROCHLORIDE 10 MG/ML
10 INJECTION, SOLUTION EPIDURAL; INFILTRATION; INTRACAUDAL; PERINEURAL ONCE
Status: COMPLETED | OUTPATIENT
Start: 2023-08-14 | End: 2023-08-14

## 2023-08-14 RX ORDER — LIDOCAINE HYDROCHLORIDE AND EPINEPHRINE 10; 10 MG/ML; UG/ML
20 INJECTION, SOLUTION INFILTRATION; PERINEURAL ONCE
Status: COMPLETED | OUTPATIENT
Start: 2023-08-14 | End: 2023-08-14

## 2023-08-14 RX ADMIN — LIDOCAINE HYDROCHLORIDE 10 ML: 10 INJECTION, SOLUTION EPIDURAL; INFILTRATION; INTRACAUDAL; PERINEURAL at 09:19

## 2023-08-14 RX ADMIN — LIDOCAINE HYDROCHLORIDE AND EPINEPHRINE 20 ML: 10; 10 INJECTION, SOLUTION INFILTRATION; PERINEURAL at 09:19

## 2023-08-15 ENCOUNTER — NURSE ONLY (OUTPATIENT)
Age: 68
End: 2023-08-15

## 2023-08-15 VITALS
OXYGEN SATURATION: 99 % | HEIGHT: 63 IN | BODY MASS INDEX: 26.93 KG/M2 | RESPIRATION RATE: 18 BRPM | HEART RATE: 87 BPM | WEIGHT: 152 LBS | TEMPERATURE: 98 F

## 2023-08-15 DIAGNOSIS — I10 ESSENTIAL HYPERTENSION: Primary | ICD-10-CM

## 2023-08-15 DIAGNOSIS — E11.9 TYPE 2 DIABETES MELLITUS WITHOUT COMPLICATION, WITHOUT LONG-TERM CURRENT USE OF INSULIN (HCC): ICD-10-CM

## 2023-08-15 NOTE — PROGRESS NOTES
Colon Screen    Patient: Nate Scott MRN: [id number]  SSN: [id number]    YOB: 1955  Age: 75 y.o. Sex: female        Subjective: Nate Scott was referred by her PCP, Caridad Banegas MD.  Patient referred for colonoscopy for   Screening colonoscopy. Patient denies rectal pain or bleeding. Abdominal surgeries as described below, specifically hysterectomy. Family history as described below, specifically none. Last colonoscopy was 10-20 years ago, patient doesn't recall if she had polyps or not. Allergies   Allergen Reactions    Diphenhydramine Rash     Hives    Hydroxyzine Pamoate Rash    Lisinopril Rash    Loratadine Rash       Past Medical History:   Diagnosis Date    Arthritis     Diabetes ([address])     Diabetic eye exam ([address]) 10/08/2018    Bilateral cataracts   No retinopathy    Hypertension      Past Surgical History:   Procedure Laterality Date    HYSTERECTOMY (CERVIX STATUS UNKNOWN)  1980    TONSILLECTOMY      as a child    US BREAST BIOPSY W LOC DEVICE 1ST LESION RIGHT Right 8/14/2023    US BREAST BIOPSY W LOC DEVICE 1ST LESION RIGHT 8/14/2023 [facility] RAD US      Family History   Problem Relation Age of Onset    Hypertension Brother     Cancer Maternal Grandmother     Breast Cancer Mother     Diabetes Mother     Hypertension Father     Thyroid Cancer Sister      Social History     Tobacco Use    Smoking status: Former    Smokeless tobacco: Never   Substance Use Topics    Alcohol use: Not Currently      Prior to Admission medications    Medication Sig Start Date End Date Taking?  Authorizing Provider   Lancets MISC Use as directed to check blood glucose levels 5/22/19  Yes Ar Automatic Reconciliation   aspirin 81 MG chewable tablet Take 1 tablet by mouth daily   Yes Ar Automatic Reconciliation   glipiZIDE (GLUCOTROL) 5 MG tablet TAKE 1 TABLET BY MOUTH THREE TIMES DAILY BEFORE BREAKFAST, LUNCH, AND DINNER 1/23/19  Yes Ar Automatic Reconciliation   indapamide (LOZOL) 1.25 MG tablet Take 1

## 2023-08-17 ENCOUNTER — CLINICAL DOCUMENTATION (OUTPATIENT)
Age: 68
End: 2023-08-17

## 2023-08-18 ENCOUNTER — CLINICAL DOCUMENTATION (OUTPATIENT)
Age: 68
End: 2023-08-18

## 2023-08-18 ENCOUNTER — OFFICE VISIT (OUTPATIENT)
Age: 68
End: 2023-08-18
Payer: MEDICARE

## 2023-08-18 VITALS
RESPIRATION RATE: 16 BRPM | SYSTOLIC BLOOD PRESSURE: 131 MMHG | WEIGHT: 158 LBS | HEIGHT: 63 IN | BODY MASS INDEX: 28 KG/M2 | HEART RATE: 100 BPM | DIASTOLIC BLOOD PRESSURE: 70 MMHG | OXYGEN SATURATION: 96 % | TEMPERATURE: 98.2 F

## 2023-08-18 DIAGNOSIS — C50.911 MALIGNANT NEOPLASM OF RIGHT BREAST IN FEMALE, ESTROGEN RECEPTOR POSITIVE, UNSPECIFIED SITE OF BREAST (HCC): Primary | ICD-10-CM

## 2023-08-18 DIAGNOSIS — Z80.3 FAMILY HISTORY OF BREAST CANCER IN MOTHER: ICD-10-CM

## 2023-08-18 DIAGNOSIS — Z17.0 MALIGNANT NEOPLASM OF RIGHT BREAST IN FEMALE, ESTROGEN RECEPTOR POSITIVE, UNSPECIFIED SITE OF BREAST (HCC): Primary | ICD-10-CM

## 2023-08-18 PROCEDURE — 99205 OFFICE O/P NEW HI 60 MIN: CPT | Performed by: SURGERY

## 2023-08-18 PROCEDURE — 1123F ACP DISCUSS/DSCN MKR DOCD: CPT | Performed by: SURGERY

## 2023-08-18 PROCEDURE — 3075F SYST BP GE 130 - 139MM HG: CPT | Performed by: SURGERY

## 2023-08-18 PROCEDURE — 3078F DIAST BP <80 MM HG: CPT | Performed by: SURGERY

## 2023-08-18 ASSESSMENT — ENCOUNTER SYMPTOMS
CHEST TIGHTNESS: 0
SHORTNESS OF BREATH: 0

## 2023-08-18 NOTE — H&P (VIEW-ONLY)
Breast Cancer Consult      Ms. Gianluca Chaves is a 75year old woman who was referred for new right breast cancer invasive ductal, grade 2, ER/AL positive, HER 2 estephanie negative. The area of concern was identified on screening mammogram.  She has no palpable mass. She has no nipple discharge. She has no breast pain. There is family history of breast cancer in her mother at unknown age. She believes her maternal grandmother had ovarian cancer. Her most recent previous mammogram was 2022 was normal.     Menarche age 14. She underwent partial hysterectomy in  for fibroids. No history of hormone replacement therapy. She has one daughter whom she breast fed, age at birth was 21.          Breast/GYN history:    OB History          1    Para        Term   1       0    AB   0    Living   1         SAB        IAB        Ectopic        Molar        Multiple        Live Births                     Past Medical History:   Diagnosis Date    Arthritis     Diabetes ([address])     Diabetic eye exam ([address]) 10/08/2018    Bilateral cataracts   No retinopathy    Hypertension        Past Surgical History:   Procedure Laterality Date    HYSTERECTOMY (CERVIX STATUS UNKNOWN)      TONSILLECTOMY      as a child    US BREAST BIOPSY W LOC DEVICE 1ST LESION RIGHT Right 2023    US BREAST BIOPSY W LOC DEVICE 1ST LESION RIGHT 2023 HBV RAD US       Current Outpatient Medications on File Prior to Visit   Medication Sig Dispense Refill    Lancets MISC Use as directed to check blood glucose levels      aspirin 81 MG chewable tablet Take 1 tablet by mouth daily      glipiZIDE (GLUCOTROL) 5 MG tablet TAKE 1 TABLET BY MOUTH THREE TIMES DAILY BEFORE BREAKFAST, LUNCH, AND DINNER      indapamide (LOZOL) 1.25 MG tablet Take 1 tablet by mouth daily      Lidocaine-Hydrocortisone Ace 3-0.5 % KIT Apply topically 2 times daily      metFORMIN (GLUCOPHAGE) 500 MG tablet Take 1 tablet by mouth 3 times daily (with meals)      rosuvastatin disease. She will be injected with radioactive isotope as well as blue dye to allow for mapping and removal of the sentinel nodes. By removing only the sentinel nodes and not performing axillary node dissection, she has a decreased risk of lymphedema (arm swelling) and nerve injury. We did specifically discuss that both of these risks still exist.   Risks, benefits and options were discussed in detail to include, but not limited to, bleeding, infection, risks of anesthesia, injury to surrounding structures and other unforeseen events such as stroke, heart attack or death. If a significant amount of cancer is noted in her lymph nodes, an axillary lymph node dissection may be recommended. In this procedure more, but not all, of the lymph nodes under the arm are removed. The chance of both lymphedema and nerve injury are increased with axillary node dissection compared to sentinel node biopsy. I generally recommend referral to physical therapy and a lymphedema specialist if an axillary node dissection is performed. We discussed radiation. Radiation is a local therapy aimed to decrease the chance of local recurrence. It is administered under the direction of a radiation oncologist.  Radiation is almost always recommended with lumpectomy. It generally is not needed after mastectomy. There are some circumstances, usually based on size, margins, local extension or lymph node status, where post-mastectomy radiation is recommended. Most commonly it is administered five days a week for up to seven weeks. Most of the side effects, with the exception of fatigue, are local.    For women with implants, the risk of contracture and other complication may be higher with radiation therapy. Anti-hormone or hormone blocking therapy is used in hormone sensitive, estrogen receptor positive breast cancer. It is generally recommended for 5-10 years. There are two categories of hormone blocking medications.

## 2023-08-18 NOTE — PROGRESS NOTES
Nate Scott is a 75 y.o. female (: 1955)     Chief Complaint   Patient presents with    New Patient     Right breast        Medication list and allergies have been reviewed with Nate Scott and updated as of today's date. I have gone over all Medical, Surgical and Social History with Nate Scott and updated/added the information accordingly.
Positive (>90% of cells stain with strong   intensity). Progesterone Receptor (PgR): Positive (>90% of cells stain with strong   intensity). Her-2 (IHC Method):          Negative (score 0)   Percentage of cells with intense complete membrane stainin%     Impression:    ICD-10-CM    1. Malignant neoplasm of right breast in female, estrogen receptor positive, unspecified site of breast ([address])  C50.911 SCHEDULE SURGERY    Z17.0 External Referral To Oncology     [facility] - Kaitlin Mayfield MD, Radiation Oncology, [place]     EKG 12 Lead, Inital     XR CHEST (2 VIEWS)      2. Family history of breast cancer in mother  Z80.2               We discussed that there are many options for treatment of breast cancer. Surgery, chemotherapy, radiation and hormone therapy are all tools that may be used in the treatment of breast cancer. For each patient, we determine which will be most beneficial based on her individual set of circumstances. For some patients all four treatment categories will be recommended. For others one or more of these options are not appropriate. We began by reviewing her imaging thus far as well as pathology in detail. Chemotherapy was addressed first.  Chemotherapy is systemic treatment aimed largely to decrease chance of spread or recurrence of cancer. It is administered by a medical oncologist.  Often the decision for chemotherapy is made after final pathology. I have recommended referral to medical oncology for additional information regarding chemotherapy. Regarding surgery, there are two main options, lumpectomy or mastectomy. We discussed both in detail. Overall survival and distant recurrence rates are the same. The decision is generally a personal decision more so than a medical one. Lumpectomy is also known as breast conservation surgery or partial mastectomy.  The goal is to remove the area of concern as well as surrounding area of uninvolved

## 2023-08-18 NOTE — PROGRESS NOTES
Patient was referred to Dr. Nahid Yap @ [facility] and Dr. Logan Anderson. Both office will call the patient for an appointment.

## 2023-08-22 ENCOUNTER — CLINICAL DOCUMENTATION (OUTPATIENT)
Age: 68
End: 2023-08-22

## 2023-08-25 RX ORDER — ORAL SEMAGLUTIDE 3 MG/1
TABLET ORAL
COMMUNITY

## 2023-08-25 RX ORDER — CANAGLIFLOZIN 300 MG/1
TABLET, FILM COATED ORAL
COMMUNITY

## 2023-08-25 NOTE — PERIOP NOTE
Instructions for your surgery at [address] [place] [address] Date:  8/25/2023      Patient's Name:  Rajesh Hernandez           Surgery Date:  9/14/2023              Please enter the main entrance of the hospital and check-[facility] at the  [facility] Once checked in at the , you will take the elevators to the second floor, and report to the waiting room on the left. The room will say Procedure Registration. Do NOT eat or drink anything, including candy, gum, or ice chips after midnight prior to your surgery, unless you have specific instructions from your surgeon or anesthesia provider to do so. Brush your teeth before coming to the hospital. You may swish with water, but do not swallow. No smoking/Vaping/E-Cigarettes 24 hours prior to the day of surgery. No alcohol 24 hours prior to the day of surgery. No recreational drugs for one week prior to the day of surgery. Bring Photo ID, Insurance information, and Co-pay if required on day of surgery. Bring in pertinent legal documents, such as, Medical Power of JIMY MARTINEZ, DNR, Advance Directive, etc.  Leave all valuables, including money/purse, at home. Remove all jewelry, including ALL body piercings, nail polish, acrylic nails, and makeup (including mascara); no lotions, powders, deodorant, or perfume/cologne/after shave on the skin. Follow instruction for Hibiclens washes and CHG wipes from surgeon's office. Glasses and dentures may be worn to the hospital. They must be removed prior to surgery. Please bring case/container for glasses or dentures. Contact lenses should not be worn on day of surgery. Call your doctor's office if symptoms of a cold or illness develop within 24-48 hours prior to your surgery. Call your doctor's office if you have any questions concerning insurance or co-pays. 15. AN ADULT (relative or friend 25 years or older) [address]   16. Please make arrangements

## 2023-08-28 ENCOUNTER — HOSPITAL ENCOUNTER (OUTPATIENT)
Facility: HOSPITAL | Age: 68
Discharge: HOME | End: 2023-08-31
Payer: MEDICARE

## 2023-08-28 DIAGNOSIS — I10 ESSENTIAL HYPERTENSION: ICD-10-CM

## 2023-08-28 DIAGNOSIS — Z17.0 MALIGNANT NEOPLASM OF RIGHT BREAST IN FEMALE, ESTROGEN RECEPTOR POSITIVE, UNSPECIFIED SITE OF BREAST (HCC): ICD-10-CM

## 2023-08-28 DIAGNOSIS — C50.911 MALIGNANT NEOPLASM OF RIGHT BREAST IN FEMALE, ESTROGEN RECEPTOR POSITIVE, UNSPECIFIED SITE OF BREAST (HCC): ICD-10-CM

## 2023-08-28 DIAGNOSIS — E11.9 TYPE 2 DIABETES MELLITUS WITHOUT COMPLICATION, WITHOUT LONG-TERM CURRENT USE OF INSULIN (HCC): ICD-10-CM

## 2023-08-28 LAB
ALBUMIN SERPL-MCNC: 4 G/DL (ref 3.4–5)
ALBUMIN/GLOB SERPL: 1.3 (ref 0.8–1.7)
ALP SERPL-CCNC: 80 U/L (ref 45–117)
ALT SERPL-CCNC: 23 U/L (ref 13–56)
ANION GAP SERPL CALC-SCNC: 5 MMOL/L (ref 3–18)
AST SERPL-CCNC: 16 U/L (ref 10–38)
BILIRUB SERPL-MCNC: 0.3 MG/DL (ref 0.2–1)
BUN SERPL-MCNC: 19 MG/DL (ref 7–18)
BUN/CREAT SERPL: 14 (ref 12–20)
CALCIUM SERPL-MCNC: 9.4 MG/DL (ref 8.5–10.1)
CHLORIDE SERPL-SCNC: 103 MMOL/L (ref 100–111)
CO2 SERPL-SCNC: 30 MMOL/L (ref 21–32)
CREAT SERPL-MCNC: 1.39 MG/DL (ref 0.6–1.3)
EKG ATRIAL RATE: 89 BPM
EKG DIAGNOSIS: NORMAL
EKG P AXIS: 31 DEGREES
EKG P-R INTERVAL: 146 MS
EKG Q-T INTERVAL: 364 MS
EKG QRS DURATION: 78 MS
EKG QTC CALCULATION (BAZETT): 442 MS
EKG R AXIS: -9 DEGREES
EKG T AXIS: 54 DEGREES
EKG VENTRICULAR RATE: 89 BPM
EST. AVERAGE GLUCOSE BLD GHB EST-MCNC: 186 MG/DL
GLOBULIN SER CALC-MCNC: 3 G/DL (ref 2–4)
GLUCOSE SERPL-MCNC: 201 MG/DL (ref 74–99)
HBA1C MFR BLD: 8.1 % (ref 4.2–5.6)
POTASSIUM SERPL-SCNC: 4.3 MMOL/L (ref 3.5–5.5)
PROT SERPL-MCNC: 7 G/DL (ref 6.4–8.2)
SODIUM SERPL-SCNC: 138 MMOL/L (ref 136–145)

## 2023-08-28 PROCEDURE — 71046 X-RAY EXAM CHEST 2 VIEWS: CPT

## 2023-08-28 PROCEDURE — 83036 HEMOGLOBIN GLYCOSYLATED A1C: CPT

## 2023-08-28 PROCEDURE — 80053 COMPREHEN METABOLIC PANEL: CPT

## 2023-08-28 PROCEDURE — 93005 ELECTROCARDIOGRAM TRACING: CPT

## 2023-08-28 PROCEDURE — 93010 ELECTROCARDIOGRAM REPORT: CPT | Performed by: INTERNAL MEDICINE

## 2023-08-28 PROCEDURE — 36415 COLL VENOUS BLD VENIPUNCTURE: CPT

## 2023-08-30 ENCOUNTER — CLINICAL DOCUMENTATION (OUTPATIENT)
Age: 68
End: 2023-08-30

## 2023-08-30 NOTE — PROGRESS NOTES
[facility]  Breast & Colorectal Oncology Nurse Navigator Encounter    Name: Andrae Chapman  Age: 75 y.o.  : 1955      Encounter type:  []Initial Navigator Encounter  []Patient Initiated  [x]Navigator Follow-up  []Other:     Narrative:   Patient arrived to office for genetic testing with Laroy Del Rio. Rodolfo Jordan phlebotomist collected specimen from patient in office. Reminded that I am available as needed for any needs/issues/questions that arise and encouraged to call; My contact info provided. All questions answered.       Interdisciplinary Team:        Nurse Navigator: HEMA Wetzel BSN, RN  Breast & Colorectal Cancer Nurse Navigator    [address] [facility]  Office number [phone]  [email or web address]  Good Help to Those in [facility] SPECIALTY [facility]

## 2023-09-07 ENCOUNTER — HOSPITAL ENCOUNTER (OUTPATIENT)
Facility: HOSPITAL | Age: 68
End: 2023-09-07
Attending: SURGERY
Payer: MEDICARE

## 2023-09-07 DIAGNOSIS — R92.8 ABNORMAL MAMMOGRAM: ICD-10-CM

## 2023-09-07 DIAGNOSIS — C50.919 INVASIVE CARCINOMA OF BREAST (HCC): ICD-10-CM

## 2023-09-07 PROCEDURE — 2500000003 HC RX 250 WO HCPCS: Performed by: SURGERY

## 2023-09-07 PROCEDURE — 19281 PERQ DEVICE BREAST 1ST IMAG: CPT

## 2023-09-07 RX ORDER — LIDOCAINE HYDROCHLORIDE 10 MG/ML
10 INJECTION, SOLUTION EPIDURAL; INFILTRATION; INTRACAUDAL; PERINEURAL ONCE
Status: COMPLETED | OUTPATIENT
Start: 2023-09-07 | End: 2023-09-07

## 2023-09-07 RX ADMIN — LIDOCAINE HYDROCHLORIDE 10 ML: 10 INJECTION, SOLUTION EPIDURAL; INFILTRATION; INTRACAUDAL; PERINEURAL at 09:40

## 2023-09-13 ENCOUNTER — ANESTHESIA EVENT (OUTPATIENT)
Facility: HOSPITAL | Age: 68
End: 2023-09-13
Payer: MEDICARE

## 2023-09-14 ENCOUNTER — HOSPITAL ENCOUNTER (OUTPATIENT)
Facility: HOSPITAL | Age: 68
Setting detail: OUTPATIENT SURGERY
Discharge: HOME | End: 2023-09-14
Attending: SURGERY | Admitting: SURGERY
Payer: MEDICARE

## 2023-09-14 ENCOUNTER — HOSPITAL ENCOUNTER (OUTPATIENT)
Facility: HOSPITAL | Age: 68
End: 2023-09-14
Attending: SURGERY
Payer: MEDICARE

## 2023-09-14 ENCOUNTER — ANESTHESIA (OUTPATIENT)
Facility: HOSPITAL | Age: 68
End: 2023-09-14
Payer: MEDICARE

## 2023-09-14 VITALS
RESPIRATION RATE: 18 BRPM | HEART RATE: 86 BPM | BODY MASS INDEX: 27.11 KG/M2 | OXYGEN SATURATION: 95 % | WEIGHT: 153 LBS | HEIGHT: 63 IN | TEMPERATURE: 97.8 F | SYSTOLIC BLOOD PRESSURE: 144 MMHG | DIASTOLIC BLOOD PRESSURE: 75 MMHG

## 2023-09-14 DIAGNOSIS — C50.911 MALIGNANT NEOPLASM OF RIGHT BREAST IN FEMALE, ESTROGEN RECEPTOR POSITIVE, UNSPECIFIED SITE OF BREAST (HCC): ICD-10-CM

## 2023-09-14 DIAGNOSIS — C50.911 INVASIVE DUCTAL CARCINOMA OF BREAST, FEMALE, RIGHT (HCC): ICD-10-CM

## 2023-09-14 DIAGNOSIS — Z17.0 MALIGNANT NEOPLASM OF RIGHT BREAST IN FEMALE, ESTROGEN RECEPTOR POSITIVE, UNSPECIFIED SITE OF BREAST (HCC): ICD-10-CM

## 2023-09-14 DIAGNOSIS — R92.8 ABNORMAL MAMMOGRAM: ICD-10-CM

## 2023-09-14 DIAGNOSIS — Z17.0 MALIGNANT NEOPLASM OF RIGHT BREAST IN FEMALE, ESTROGEN RECEPTOR POSITIVE, UNSPECIFIED SITE OF BREAST (HCC): Primary | ICD-10-CM

## 2023-09-14 DIAGNOSIS — C50.911 MALIGNANT NEOPLASM OF RIGHT BREAST IN FEMALE, ESTROGEN RECEPTOR POSITIVE, UNSPECIFIED SITE OF BREAST (HCC): Primary | ICD-10-CM

## 2023-09-14 LAB
GLUCOSE BLD STRIP.AUTO-MCNC: 113 MG/DL (ref 70–110)
GLUCOSE BLD STRIP.AUTO-MCNC: 125 MG/DL (ref 70–110)

## 2023-09-14 PROCEDURE — 38525 BIOPSY/REMOVAL LYMPH NODES: CPT | Performed by: SURGERY

## 2023-09-14 PROCEDURE — 3600000012 HC SURGERY LEVEL 2 ADDTL 15MIN: Performed by: SURGERY

## 2023-09-14 PROCEDURE — 6360000002 HC RX W HCPCS: Performed by: NURSE ANESTHETIST, CERTIFIED REGISTERED

## 2023-09-14 PROCEDURE — 94761 N-INVAS EAR/PLS OXIMETRY MLT: CPT

## 2023-09-14 PROCEDURE — 6370000000 HC RX 637 (ALT 250 FOR IP): Performed by: NURSE ANESTHETIST, CERTIFIED REGISTERED

## 2023-09-14 PROCEDURE — 2720000010 HC SURG SUPPLY STERILE: Performed by: SURGERY

## 2023-09-14 PROCEDURE — 76098 X-RAY EXAM SURGICAL SPECIMEN: CPT

## 2023-09-14 PROCEDURE — 2580000003 HC RX 258: Performed by: SURGERY

## 2023-09-14 PROCEDURE — 7100000001 HC PACU RECOVERY - ADDTL 15 MIN: Performed by: SURGERY

## 2023-09-14 PROCEDURE — 2700000000 HC OXYGEN THERAPY PER DAY

## 2023-09-14 PROCEDURE — 6370000000 HC RX 637 (ALT 250 FOR IP): Performed by: SURGERY

## 2023-09-14 PROCEDURE — 3700000000 HC ANESTHESIA ATTENDED CARE: Performed by: SURGERY

## 2023-09-14 PROCEDURE — 2500000003 HC RX 250 WO HCPCS: Performed by: NURSE ANESTHETIST, CERTIFIED REGISTERED

## 2023-09-14 PROCEDURE — A9541 TC99M SULFUR COLLOID: HCPCS | Performed by: SURGERY

## 2023-09-14 PROCEDURE — 88307 TISSUE EXAM BY PATHOLOGIST: CPT

## 2023-09-14 PROCEDURE — 2580000003 HC RX 258: Performed by: NURSE ANESTHETIST, CERTIFIED REGISTERED

## 2023-09-14 PROCEDURE — 7100000011 HC PHASE II RECOVERY - ADDTL 15 MIN: Performed by: SURGERY

## 2023-09-14 PROCEDURE — 19301 PARTIAL MASTECTOMY: CPT | Performed by: SURGERY

## 2023-09-14 PROCEDURE — 2500000003 HC RX 250 WO HCPCS: Performed by: SURGERY

## 2023-09-14 PROCEDURE — 7100000010 HC PHASE II RECOVERY - FIRST 15 MIN: Performed by: SURGERY

## 2023-09-14 PROCEDURE — 3600000002 HC SURGERY LEVEL 2 BASE: Performed by: SURGERY

## 2023-09-14 PROCEDURE — 82962 GLUCOSE BLOOD TEST: CPT

## 2023-09-14 PROCEDURE — 7100000000 HC PACU RECOVERY - FIRST 15 MIN: Performed by: SURGERY

## 2023-09-14 PROCEDURE — 3430000000 HC RX DIAGNOSTIC RADIOPHARMACEUTICAL: Performed by: SURGERY

## 2023-09-14 PROCEDURE — 6360000002 HC RX W HCPCS: Performed by: SURGERY

## 2023-09-14 PROCEDURE — 3700000001 HC ADD 15 MINUTES (ANESTHESIA): Performed by: SURGERY

## 2023-09-14 PROCEDURE — A4217 STERILE WATER/SALINE, 500 ML: HCPCS | Performed by: SURGERY

## 2023-09-14 PROCEDURE — 38900 IO MAP OF SENT LYMPH NODE: CPT | Performed by: SURGERY

## 2023-09-14 PROCEDURE — 2709999900 HC NON-CHARGEABLE SUPPLY: Performed by: SURGERY

## 2023-09-14 RX ORDER — MIDAZOLAM HYDROCHLORIDE 1 MG/ML
INJECTION INTRAMUSCULAR; INTRAVENOUS PRN
Status: DISCONTINUED | OUTPATIENT
Start: 2023-09-14 | End: 2023-09-14 | Stop reason: SDUPTHER

## 2023-09-14 RX ORDER — DEXAMETHASONE SODIUM PHOSPHATE 4 MG/ML
INJECTION, SOLUTION INTRA-ARTICULAR; INTRALESIONAL; INTRAMUSCULAR; INTRAVENOUS; SOFT TISSUE PRN
Status: DISCONTINUED | OUTPATIENT
Start: 2023-09-14 | End: 2023-09-14 | Stop reason: SDUPTHER

## 2023-09-14 RX ORDER — SODIUM CHLORIDE 9 MG/ML
INJECTION, SOLUTION INTRAVENOUS PRN
Status: DISCONTINUED | OUTPATIENT
Start: 2023-09-14 | End: 2023-09-14 | Stop reason: HOSPADM

## 2023-09-14 RX ORDER — IPRATROPIUM BROMIDE AND ALBUTEROL SULFATE 2.5; .5 MG/3ML; MG/3ML
1 SOLUTION RESPIRATORY (INHALATION)
Status: DISCONTINUED | OUTPATIENT
Start: 2023-09-14 | End: 2023-09-14 | Stop reason: HOSPADM

## 2023-09-14 RX ORDER — SODIUM CHLORIDE 0.9 % (FLUSH) 0.9 %
5-40 SYRINGE (ML) INJECTION PRN
Status: DISCONTINUED | OUTPATIENT
Start: 2023-09-14 | End: 2023-09-14 | Stop reason: HOSPADM

## 2023-09-14 RX ORDER — FAMOTIDINE 20 MG/1
20 TABLET, FILM COATED ORAL ONCE
Status: COMPLETED | OUTPATIENT
Start: 2023-09-14 | End: 2023-09-14

## 2023-09-14 RX ORDER — DEXTROSE MONOHYDRATE 100 MG/ML
INJECTION, SOLUTION INTRAVENOUS CONTINUOUS PRN
Status: DISCONTINUED | OUTPATIENT
Start: 2023-09-14 | End: 2023-09-14 | Stop reason: HOSPADM

## 2023-09-14 RX ORDER — LIDOCAINE HYDROCHLORIDE 20 MG/ML
INJECTION, SOLUTION EPIDURAL; INFILTRATION; INTRACAUDAL; PERINEURAL PRN
Status: DISCONTINUED | OUTPATIENT
Start: 2023-09-14 | End: 2023-09-14 | Stop reason: SDUPTHER

## 2023-09-14 RX ORDER — ONDANSETRON 2 MG/ML
INJECTION INTRAMUSCULAR; INTRAVENOUS PRN
Status: DISCONTINUED | OUTPATIENT
Start: 2023-09-14 | End: 2023-09-14 | Stop reason: SDUPTHER

## 2023-09-14 RX ORDER — FENTANYL CITRATE 50 UG/ML
50 INJECTION, SOLUTION INTRAMUSCULAR; INTRAVENOUS EVERY 5 MIN PRN
Status: DISCONTINUED | OUTPATIENT
Start: 2023-09-14 | End: 2023-09-14 | Stop reason: HOSPADM

## 2023-09-14 RX ORDER — INSULIN LISPRO 100 [IU]/ML
0-15 INJECTION, SOLUTION INTRAVENOUS; SUBCUTANEOUS ONCE
Status: DISCONTINUED | OUTPATIENT
Start: 2023-09-14 | End: 2023-09-14 | Stop reason: HOSPADM

## 2023-09-14 RX ORDER — SODIUM CHLORIDE 0.9 % (FLUSH) 0.9 %
5-40 SYRINGE (ML) INJECTION EVERY 12 HOURS SCHEDULED
Status: DISCONTINUED | OUTPATIENT
Start: 2023-09-14 | End: 2023-09-14 | Stop reason: HOSPADM

## 2023-09-14 RX ORDER — PROPOFOL 10 MG/ML
INJECTION, EMULSION INTRAVENOUS PRN
Status: DISCONTINUED | OUTPATIENT
Start: 2023-09-14 | End: 2023-09-14 | Stop reason: SDUPTHER

## 2023-09-14 RX ORDER — HYDROCODONE BITARTRATE AND ACETAMINOPHEN 5; 325 MG/1; MG/1
1 TABLET ORAL EVERY 4 HOURS PRN
Qty: 18 TABLET | Refills: 0 | Status: SHIPPED | OUTPATIENT
Start: 2023-09-14 | End: 2023-09-17

## 2023-09-14 RX ORDER — FENTANYL CITRATE 50 UG/ML
25 INJECTION, SOLUTION INTRAMUSCULAR; INTRAVENOUS EVERY 5 MIN PRN
Status: DISCONTINUED | OUTPATIENT
Start: 2023-09-14 | End: 2023-09-14 | Stop reason: HOSPADM

## 2023-09-14 RX ORDER — LIDOCAINE HYDROCHLORIDE 10 MG/ML
1 INJECTION, SOLUTION EPIDURAL; INFILTRATION; INTRACAUDAL; PERINEURAL
Status: DISCONTINUED | OUTPATIENT
Start: 2023-09-14 | End: 2023-09-14 | Stop reason: HOSPADM

## 2023-09-14 RX ORDER — SODIUM CHLORIDE, SODIUM LACTATE, POTASSIUM CHLORIDE, CALCIUM CHLORIDE 600; 310; 30; 20 MG/100ML; MG/100ML; MG/100ML; MG/100ML
INJECTION, SOLUTION INTRAVENOUS CONTINUOUS
Status: DISCONTINUED | OUTPATIENT
Start: 2023-09-14 | End: 2023-09-14 | Stop reason: HOSPADM

## 2023-09-14 RX ORDER — LIDOCAINE HYDROCHLORIDE 10 MG/ML
30 INJECTION, SOLUTION EPIDURAL; INFILTRATION; INTRACAUDAL; PERINEURAL ONCE
Status: COMPLETED | OUTPATIENT
Start: 2023-09-14 | End: 2023-09-14

## 2023-09-14 RX ORDER — FENTANYL CITRATE 50 UG/ML
INJECTION, SOLUTION INTRAMUSCULAR; INTRAVENOUS PRN
Status: DISCONTINUED | OUTPATIENT
Start: 2023-09-14 | End: 2023-09-14 | Stop reason: SDUPTHER

## 2023-09-14 RX ORDER — HYDROCODONE BITARTRATE AND ACETAMINOPHEN 5; 325 MG/1; MG/1
1 TABLET ORAL
Status: COMPLETED | OUTPATIENT
Start: 2023-09-14 | End: 2023-09-14

## 2023-09-14 RX ORDER — INSULIN LISPRO 100 [IU]/ML
0-16 INJECTION, SOLUTION INTRAVENOUS; SUBCUTANEOUS AS NEEDED
Status: DISCONTINUED | OUTPATIENT
Start: 2023-09-14 | End: 2023-09-14 | Stop reason: HOSPADM

## 2023-09-14 RX ORDER — AMLODIPINE BESYLATE 10 MG/1
10 TABLET ORAL DAILY
COMMUNITY

## 2023-09-14 RX ORDER — 0.9 % SODIUM CHLORIDE 0.9 %
50 INTRAVENOUS SOLUTION INTRAVENOUS ONCE
Status: DISCONTINUED | OUTPATIENT
Start: 2023-09-14 | End: 2023-09-14

## 2023-09-14 RX ADMIN — WATER 2000 MG: 1 INJECTION, SOLUTION INTRAMUSCULAR; INTRAVENOUS; SUBCUTANEOUS at 13:10

## 2023-09-14 RX ADMIN — HYDROCODONE BITARTRATE AND ACETAMINOPHEN 1 TABLET: 5; 325 TABLET ORAL at 15:17

## 2023-09-14 RX ADMIN — LIDOCAINE HYDROCHLORIDE 3 ML: 10 INJECTION, SOLUTION EPIDURAL; INFILTRATION; INTRACAUDAL; PERINEURAL at 09:00

## 2023-09-14 RX ADMIN — PROPOFOL 200 MG: 10 INJECTION, EMULSION INTRAVENOUS at 13:07

## 2023-09-14 RX ADMIN — MIDAZOLAM 2 MG: 1 INJECTION, SOLUTION INTRAMUSCULAR; INTRAVENOUS at 12:58

## 2023-09-14 RX ADMIN — Medication 1.1 MILLICURIE: at 09:00

## 2023-09-14 RX ADMIN — SODIUM BICARBONATE 3 MEQ: 84 INJECTION, SOLUTION INTRAVENOUS at 09:00

## 2023-09-14 RX ADMIN — ONDANSETRON 4 MG: 2 INJECTION INTRAMUSCULAR; INTRAVENOUS at 13:24

## 2023-09-14 RX ADMIN — FAMOTIDINE 20 MG: 20 TABLET, FILM COATED ORAL at 10:24

## 2023-09-14 RX ADMIN — SODIUM CHLORIDE, POTASSIUM CHLORIDE, SODIUM LACTATE AND CALCIUM CHLORIDE: 600; 310; 30; 20 INJECTION, SOLUTION INTRAVENOUS at 10:21

## 2023-09-14 RX ADMIN — FENTANYL CITRATE 50 MCG: 50 INJECTION INTRAMUSCULAR; INTRAVENOUS at 13:50

## 2023-09-14 RX ADMIN — DEXAMETHASONE SODIUM PHOSPHATE 4 MG: 4 INJECTION, SOLUTION INTRAMUSCULAR; INTRAVENOUS at 13:24

## 2023-09-14 RX ADMIN — FENTANYL CITRATE 50 MCG: 50 INJECTION INTRAMUSCULAR; INTRAVENOUS at 13:07

## 2023-09-14 RX ADMIN — LIDOCAINE HYDROCHLORIDE 40 MG: 20 INJECTION, SOLUTION EPIDURAL; INFILTRATION; INTRACAUDAL; PERINEURAL at 13:07

## 2023-09-14 RX ADMIN — FENTANYL CITRATE 50 MCG: 50 INJECTION, SOLUTION INTRAMUSCULAR; INTRAVENOUS at 14:32

## 2023-09-14 ASSESSMENT — PAIN DESCRIPTION - DESCRIPTORS
DESCRIPTORS: SORE
DESCRIPTORS: SHARP

## 2023-09-14 ASSESSMENT — PAIN DESCRIPTION - LOCATION
LOCATION: BREAST;INCISION
LOCATION: BREAST;INCISION

## 2023-09-14 ASSESSMENT — PAIN DESCRIPTION - ORIENTATION
ORIENTATION: RIGHT
ORIENTATION: RIGHT

## 2023-09-14 ASSESSMENT — PAIN SCALES - GENERAL
PAINLEVEL_OUTOF10: 6
PAINLEVEL_OUTOF10: 7

## 2023-09-14 ASSESSMENT — PAIN - FUNCTIONAL ASSESSMENT: PAIN_FUNCTIONAL_ASSESSMENT: 0-10

## 2023-09-14 NOTE — DISCHARGE INSTRUCTIONS
Post Operative Discharge Instructions    No driving for 24 hours after surgery and off of prescription pain medication. Avoid activities that bump or cause jarring movements at the surgical site for 10 days. No lifting more than 10-15 pounds for 2 weeks after surgery or until cleared for activity at your follow up. Walking is encouraged after surgery. Stairs are ok to climb. DIET:    Diet as tolerated. Start with liquids then advance your diet based on how you fell. No alcoholic beverages for 24 hours after surgery or while on antibiotics or pain mdications. Drink plenty of water. MEDICATIONS:    Use daily stool softners (over the counter such as Colace or Senekot) while on pain medications. Resume pre-operative medications. If you are on any blood thinners see special instructions below. Use prescriptions given or Tylenol, Ibuprofen as needed for pain. Do not use more than 4000mg of Tylenol (acetaminophen) per day. Be aware this may be  in your prescription medication as well. Be aware narcotic prescriptions are tightly controlled in the [facility]. If requiring more than one refill, a follow up appointment will be required. WOUND CARE:      You have skin glue on your incisions, you may shower in 24 hours and pat dry. Glue will fall off on it's own. Wear your surgical bra or own supportive bra at all times until cleared at your follow up    Do not tub bathe, swim, or soak incisions until cleared to do so at your follow up. Ice bag to the affected area; 20 minutes on and 20 minutes off if desired. FOLLOW UP CARE:    You should have an appointment scheduled within 14 days after surgery. If this is not yet scheduled, call the office.   Any forms that you need filled out regarding your medical care can be brought to the office at follow up appointment of faxed to: [address] IF:  Temperature is over 101 degrees, a slight fever can be normal

## 2023-09-14 NOTE — ANESTHESIA PRE PROCEDURE
Applicable):  No results found for: \"LABABO\", \"LABRH\"    Drug/Infectious Status (If Applicable):  No results found for: \"HIV\", \"HEPCAB\"    COVID-19 Screening (If Applicable): No results found for: \"COVID19\"        Anesthesia Evaluation  Patient summary reviewed  Airway: Mallampati: II     Neck ROM: limited  Mouth opening: > = 3 FB   Dental:    (+) poor dentition      Pulmonary:Negative Pulmonary ROS breath sounds clear to auscultation                             Cardiovascular:    (+) hypertension:,         Rhythm: regular  Rate: normal                    Neuro/Psych:               GI/Hepatic/Renal:             Endo/Other:    (+) DiabetesType II DM, , malignancy/cancer. Abdominal:             Vascular: Other Findings:           Anesthesia Plan      general     ASA 2       Induction: intravenous. MIPS: Postoperative opioids intended. Anesthetic plan and risks discussed with patient.                         Berry Siddiqi MD   9/14/2023

## 2023-09-14 NOTE — ANESTHESIA POSTPROCEDURE EVALUATION
Department of Anesthesiology  Postprocedure Note    Patient:  Praveena Azevedo  MRN: [id number]  YOB: 1955  Date of evaluation: 9/14/2023      Procedure Summary     Date: 09/14/23 Room / Location: [facility] MAIN 01 / [facility] MAIN OR    Anesthesia Start: 1258 Anesthesia Stop: 3617    Procedure: RIGHT BREAST LOCALIZED LUMPECTOMY, RIGHT AXILLARY SENTINEL LYMPH NODE BIOPSY (Right: Breast) Diagnosis:       Malignant neoplasm of right breast in female, estrogen receptor positive, unspecified site of breast ([address])      (Malignant neoplasm of right breast in female, estrogen receptor positive, unspecified site of breast ([address]) [C50.911, Z17.0])    Surgeons: yLnn Haynes DO Responsible Provider: Sue Howell MD    Anesthesia Type: General ASA Status: 2          Anesthesia Type: General    Martinez Phase I: Martinez Score: 8    Martinez Phase II: Martinez Score: 10      Anesthesia Post Evaluation    Patient location during evaluation: bedside  Patient participation: complete - patient participated  Airway patency: patent  Complications: no  Cardiovascular status: hemodynamically stable  Respiratory status: acceptable  Hydration status: stable

## 2023-09-14 NOTE — INTERVAL H&P NOTE
Update History & Physical    The patient's History and Physical of 9/14/2023   was reviewed with the patient and I examined the patient. There was no change. The surgical site was confirmed by the patient and me. Plan: The risks, benefits, expected outcome, and alternative to the recommended procedure have been discussed with the patient. Patient understands and wants to proceed with the procedure.      Electronically signed by Felicia Ruvalcaba DO on 9/14/2023 at 10:35 AM

## 2023-09-14 NOTE — OP NOTE
Right breast localized lumpectomy, right axillary sentinel lymph node biopsy    Patient Name: Analilia Orozco    SURGERY DATE: 23    : 1955    AGE: 75 y.o. Anesthesiologist: Anesthesiologist: Bryant Dennison MD  CRNA: SIMONE Champagne CRNA    Anesthesia: General    Surgical Assist: Circulator: Jovany Magallanes RN  Surgical Assistant: Maggie Valero  Scrub Person First: Dalton Flavio    PreOp DX: Malignant neoplasm of right breast in female, estrogen receptor positive, unspecified site of breast ([address]) [C50.911, Z17.0]    PostOp DX: same    Procedure Details: After informed consent was obtained the patient was taken to the operating room and placed in the supine position. General anesthesia was administered by the anesthetist to titrate to effect. Next 3cc of methylene blue was injected in the upper outer quadrant of the right breast. Tech 99 was injected by radiology prior to the procedure-please see their report for details. The right breast was prepped and draped in the usual sterile fashion and a time out procedure was performed. Next the localizer was used to identify the tag in the breast at the 12 oclock position. Local anesthesia was used to anesthetize the skin. Using a 15 blade scalpel an incision was made along the areola. Using the localizer probe to guide dissection bovie was used to dissect through the breast tissue  to the shortest distance identified by the probe. An scot clamped was then used to grasp the breast tissue and probe confirmed the TAG within the specimen. Circumferential dissection was performed. The specimen was then marked single short superior, single long lateral, double long anterior and sent of the field. The TAG was not in this specimen and I felt that it had migrated out. Tissue superior to this however felt firm from previous hematoma. This was circumferentially dissected.   It was then marked single short superior, single long lateral, double long anterior and the TAG was noted free floating. Faxitron was used to confirm the TAG retrieved and clip was in the specimen with good margin. The wound was irrigated and suctioned dry and found to be hemostatic. Clips were placed in the bed of the wound. Th deep breast tissue was closed with 2-0 vicryl suture. The deep dermis was then re -approximated with 3-0 vicryl in a simple interrupted fashion and skin was closed with 4-0 monocryl in a subcuticular fashion. In the axilla, I identified the appropriate region in the axilla with the gamma probe and made an incision in the axilla, dissecting through the subcutaneous tissue into the deep axilla. All sentinel lymph nodes were identified in the deep axilla which were both hot and blue. These were removed with cautery. Lymphatics were clipped. The axillary fascia was closed with 3-0 vicryl. The deep dermal layer was reapproximated with 3-0 vicryl, the skin closed with monocryl subcuticular. Dermabond was then applied. The family was updated after the procedure. The patient was taken to recovery in good condition. Implant: * No implants in log *     Estimated Blood Loss:  10cc    Specimens: right breast mass single short superior, single long lateral, double long anterior 1. Right breast mass 2 single short superior, single long lateral, double long anterior.   Right sentinel lymph node biopsy 5,7,7            Complications: None           Disposition: extubated, tolerated procedure well           Condition: Stable    Mar Denson, DO

## 2023-09-19 ENCOUNTER — CLINICAL DOCUMENTATION (OUTPATIENT)
Age: 68
End: 2023-09-19

## 2023-09-19 ENCOUNTER — TELEPHONE (OUTPATIENT)
Age: 68
End: 2023-09-19

## 2023-09-19 NOTE — TELEPHONE ENCOUNTER
[facility]  Breast & Colorectal Oncology Nurse Navigator Encounter    Name: Leilani Hernandez  Age: 75 y.o.  : 1955      Encounter type:  []Initial Navigator Encounter  []Patient Initiated  [x]Navigator Follow-up  []Other:     Narrative:     Date/Time:      2023 at 1pm  Attempted to reach patient by telephone regarding negative genetic result. Left HIPPA compliant message requesting a return call. Will attempt to reach patient again.     Interdisciplinary Team:    Nurse Navigator: HEMA Duarte BSN, RN  Breast & Colorectal Cancer Nurse Navigator    [address] [facility]  Office number [phone]  [email or web address]  Good Help to Those in [facility] SPECIALTY [facility]

## 2023-09-19 NOTE — PROGRESS NOTES
[address] [facility]  Breast & Colorectal Oncology Nurse Navigator Encounter    Name: Sheila Childs  Age: 75 y.o.  : 1955      Encounter type:  []Initial Navigator Encounter  [x]Patient Initiated  []Navigator Follow-up  []Other:     Narrative:     Patient returned call, negative genetic results given. No questions or concerns regarding results. Declined Eron information for genetic counselor. Informed patient [facility] have been trying to reach her to schedule appointment to see Oncologist Dr. Anita Rhodes. [facility] number at [phone] given for patient to call to set up appointment. Confirmed post op appointment with Dr. Chapito Rodrigues on . All questions answered. Reminded that I am available as needed for any needs/issues/questions that arise and encouraged to call; My contact info provided. All questions answered. Will follow up with Sheila Childs  to discuss needs and plan of care.     Interdisciplinary Team:          Nurse Navigator: HEMA Green BSN, RN  Breast & Colorectal Cancer Nurse Navigator    [address] [facility]  Office number [phone]  [email or web address]  Good Help to Those in [facility] SPECIALTY [facility]

## 2023-09-27 ENCOUNTER — OFFICE VISIT (OUTPATIENT)
Age: 68
End: 2023-09-27

## 2023-09-27 VITALS
HEIGHT: 64 IN | HEART RATE: 95 BPM | DIASTOLIC BLOOD PRESSURE: 64 MMHG | TEMPERATURE: 98 F | BODY MASS INDEX: 25.95 KG/M2 | SYSTOLIC BLOOD PRESSURE: 124 MMHG | OXYGEN SATURATION: 98 % | RESPIRATION RATE: 18 BRPM | WEIGHT: 152 LBS

## 2023-09-27 DIAGNOSIS — Z17.0 MALIGNANT NEOPLASM OF RIGHT BREAST IN FEMALE, ESTROGEN RECEPTOR POSITIVE, UNSPECIFIED SITE OF BREAST (HCC): Primary | ICD-10-CM

## 2023-09-27 DIAGNOSIS — C50.911 MALIGNANT NEOPLASM OF RIGHT BREAST IN FEMALE, ESTROGEN RECEPTOR POSITIVE, UNSPECIFIED SITE OF BREAST (HCC): Primary | ICD-10-CM

## 2023-09-27 PROCEDURE — 99024 POSTOP FOLLOW-UP VISIT: CPT | Performed by: SURGERY

## 2023-09-27 RX ORDER — ALOGLIPTIN 25 MG/1
25 TABLET, FILM COATED ORAL DAILY
COMMUNITY
Start: 2023-06-30

## 2023-09-27 NOTE — PROGRESS NOTES
Chief Complaint   Patient presents with    Post-Op Check     Right breast localized lumpectomy, right axillary sentinel lymph node biopsy    1. Have you been to the ER, urgent care clinic since your last visit? Hospitalized since your last visit? No    2. Have you seen or consulted any other health care providers outside of the [address] [place] [address] Avenue since your last visit? Include any pap smears or colon screening.  No

## 2023-09-28 NOTE — PROGRESS NOTES
CC:   Chief Complaint   Patient presents with    Post-Op Check     Right breast localized lumpectomy, right axillary sentinel lymph node biopsy        Assessment:    ICD-10-CM    1. Malignant neoplasm of right breast in female, estrogen receptor positive, unspecified site of breast ([address])  C50.911     Z17.0           Plan: I reviewed the pathology report with the patient demonstrating negative margins and negative sentinel lymph node. She was instructed on breast massage and exercises daily. We will provide her with prescription for bra. She has upcoming appointment with oncology. I would like to see her back in 3 months or sooner should she have any questions or concerns. She agrees with this plan. HPI:  Mahesh Chavira is a 75 y.o. female who is here today for her initial follow up 9/14/2023 right breast localized lumpectomy, right axillary sentinel lymph node biopsy. She is doing well today with occasional discomfort in the breast but overall doing well. No fevers, chills or drainage from her incision. She has upcoming appointment with oncology in October. Allergies:   Allergies   Allergen Reactions    Diphenhydramine Rash     Hives    Hydroxyzine Pamoate Rash    Lisinopril Rash    Loratadine Rash       Medication Review:  Current Outpatient Medications on File Prior to Visit   Medication Sig Dispense Refill    alogliptin (NESINA) 25 MG TABS tablet Take 1 tablet by mouth daily      amLODIPine (NORVASC) 10 MG tablet Take 1 tablet by mouth daily      canagliflozin (INVOKANA) 300 MG TABS tablet 1 tablet before the first meal of the day Orally Once a day **Please see notes for [facility] Portal Billing for 90 days (Patient not taking: Reported on 9/27/2023)      Semaglutide (RYBELSUS) 3 MG TABS 1 tablet at least 30 minutes before first food, beverage or other oral medicine of the day Orally Once a day for 30 days (Patient not taking: Reported on 9/27/2023)      Lancets MISC Use as directed to check blood glucose

## 2023-09-29 ENCOUNTER — TELEPHONE (OUTPATIENT)
Age: 68
End: 2023-09-29

## 2023-09-29 NOTE — TELEPHONE ENCOUNTER
302 [facility]  Breast & Colorectal Oncology Nurse Navigator Encounter    Name: Rajesh Cesar  Age: 75 y.o.  : 1955      Encounter type:  []Initial Navigator Encounter  []Patient Initiated  [x]Navigator Follow-up  []Other:     Narrative:     Date/Time:      2023 at 3:19pm   Attempted to reach patient by telephone regarding scheduling of appointment with [facility]. Left HIPPA compliant message requesting a return call. Will attempt to reach patient again.       Interdisciplinary Team:    Nurse Navigator: HEMA Wilder BSN, RN  Breast & Colorectal Cancer Nurse Navigator    [address] [facility]  Office number [phone]  [email or web address]  Good Help to Those in [facility] SPECIALTY [facility]

## 2023-10-02 ENCOUNTER — HOSPITAL ENCOUNTER (OUTPATIENT)
Facility: HOSPITAL | Age: 68
Setting detail: RECURRING SERIES
Discharge: HOME | End: 2023-10-05
Payer: MEDICARE

## 2023-10-02 PROCEDURE — 99213 OFFICE O/P EST LOW 20 MIN: CPT

## 2023-10-02 PROCEDURE — 99205 OFFICE O/P NEW HI 60 MIN: CPT | Performed by: RADIOLOGY

## 2023-10-04 ENCOUNTER — TELEPHONE (OUTPATIENT)
Age: 68
End: 2023-10-04

## 2023-10-04 ENCOUNTER — CLINICAL DOCUMENTATION (OUTPATIENT)
Age: 68
End: 2023-10-04

## 2023-10-04 ENCOUNTER — HOSPITAL ENCOUNTER (OUTPATIENT)
Facility: HOSPITAL | Age: 68
Setting detail: SPECIMEN
Discharge: HOME | End: 2023-10-07

## 2023-10-04 NOTE — PROGRESS NOTES
302 [facility]  Breast & Colorectal Oncology Nurse Navigator Encounter    Name: Tiffany Hendrickson  Age: 75 y.o.  : 1955      Encounter type:  []Initial Navigator Encounter  [x]Patient Initiated  []Navigator Follow-up  []Other:     Narrative:   Patient called with needing script sent to [facility] since [facility] do not accept her insurance. I've informed Arielle RN, Dr. Jamia Hernandez nurse who states, she will send the script. Patient appointment for fitting is scheduled for 10/11/2023. Patient states, she has seen oncology and radiation oncologist on 10/02/2023. She's up to date on all her appointments and just waiting to start radiation. She had no further questions or concerns at this time. Reminded that I am available as needed for any needs/issues/questions that arise and encouraged to call; My contact info provided. All questions answered. Will follow up with Tiffany Hendrickson  to discuss needs and plan of care.     Interdisciplinary Team:          Nurse Navigator: HEMA Newman BSN, RN  Breast & Colorectal Cancer Nurse Navigator    [address] [facility]  Office number [phone]  [email or web address]  Good Help to Those in [facility] SPECIALTY [facility]

## 2023-10-18 ENCOUNTER — HOSPITAL ENCOUNTER (OUTPATIENT)
Facility: HOSPITAL | Age: 68
Setting detail: RECURRING SERIES
Discharge: HOME | End: 2023-10-21
Attending: RADIOLOGY
Payer: MEDICARE

## 2023-10-18 PROCEDURE — 77332 RADIATION TREATMENT AID(S): CPT

## 2023-10-18 PROCEDURE — 77263 THER RADIOLOGY TX PLNG CPLX: CPT | Performed by: RADIOLOGY

## 2023-10-24 ENCOUNTER — HOSPITAL ENCOUNTER (OUTPATIENT)
Facility: HOSPITAL | Age: 68
Setting detail: RECURRING SERIES
Discharge: HOME | End: 2023-10-27
Payer: MEDICARE

## 2023-10-25 ENCOUNTER — HOSPITAL ENCOUNTER (OUTPATIENT)
Facility: HOSPITAL | Age: 68
Setting detail: RECURRING SERIES
Discharge: HOME | End: 2023-10-28
Attending: RADIOLOGY
Payer: MEDICARE

## 2023-10-25 LAB
RAD ONC ARIA COURSE FIRST TREATMENT DATE: NORMAL
RAD ONC ARIA COURSE ID: NORMAL
RAD ONC ARIA COURSE INTENT: NORMAL
RAD ONC ARIA COURSE LAST TREATMENT DATE: NORMAL
RAD ONC ARIA COURSE SESSION NUMBER: 1
RAD ONC ARIA COURSE START DATE: NORMAL
RAD ONC ARIA COURSE TREATMENT ELAPSED DAYS: 0
RAD ONC ARIA PLAN FRACTIONS TREATED TO DATE: 1
RAD ONC ARIA PLAN ID: NORMAL
RAD ONC ARIA PLAN PRESCRIBED DOSE PER FRACTION: 2.66 GY
RAD ONC ARIA PLAN PRIMARY REFERENCE POINT: NORMAL
RAD ONC ARIA PLAN TOTAL FRACTIONS PRESCRIBED: 16
RAD ONC ARIA PLAN TOTAL PRESCRIBED DOSE: 4256 CGY
RAD ONC ARIA REFERENCE POINT DOSAGE GIVEN TO DATE: 2.66 GY
RAD ONC ARIA REFERENCE POINT ID: NORMAL
RAD ONC ARIA REFERENCE POINT SESSION DOSAGE GIVEN: 2.66 GY

## 2023-10-25 PROCEDURE — 77412 RADIATION TX DELIVERY LVL 3: CPT

## 2023-10-26 ENCOUNTER — HOSPITAL ENCOUNTER (OUTPATIENT)
Facility: HOSPITAL | Age: 68
Setting detail: RECURRING SERIES
Discharge: HOME | End: 2023-10-29
Attending: RADIOLOGY
Payer: MEDICARE

## 2023-10-26 LAB
RAD ONC ARIA COURSE FIRST TREATMENT DATE: NORMAL
RAD ONC ARIA COURSE ID: NORMAL
RAD ONC ARIA COURSE INTENT: NORMAL
RAD ONC ARIA COURSE LAST TREATMENT DATE: NORMAL
RAD ONC ARIA COURSE SESSION NUMBER: 2
RAD ONC ARIA COURSE START DATE: NORMAL
RAD ONC ARIA COURSE TREATMENT ELAPSED DAYS: 1
RAD ONC ARIA PLAN FRACTIONS TREATED TO DATE: 2
RAD ONC ARIA PLAN ID: NORMAL
RAD ONC ARIA PLAN PRESCRIBED DOSE PER FRACTION: 2.66 GY
RAD ONC ARIA PLAN PRIMARY REFERENCE POINT: NORMAL
RAD ONC ARIA PLAN TOTAL FRACTIONS PRESCRIBED: 16
RAD ONC ARIA PLAN TOTAL PRESCRIBED DOSE: 4256 CGY
RAD ONC ARIA REFERENCE POINT DOSAGE GIVEN TO DATE: 5.32 GY
RAD ONC ARIA REFERENCE POINT ID: NORMAL
RAD ONC ARIA REFERENCE POINT SESSION DOSAGE GIVEN: 2.66 GY

## 2023-10-26 PROCEDURE — 77412 RADIATION TX DELIVERY LVL 3: CPT

## 2023-10-27 ENCOUNTER — HOSPITAL ENCOUNTER (OUTPATIENT)
Facility: HOSPITAL | Age: 68
Setting detail: RECURRING SERIES
Discharge: HOME | End: 2023-10-30
Attending: RADIOLOGY
Payer: MEDICARE

## 2023-10-27 LAB
RAD ONC ARIA COURSE FIRST TREATMENT DATE: NORMAL
RAD ONC ARIA COURSE ID: NORMAL
RAD ONC ARIA COURSE INTENT: NORMAL
RAD ONC ARIA COURSE LAST TREATMENT DATE: NORMAL
RAD ONC ARIA COURSE SESSION NUMBER: 3
RAD ONC ARIA COURSE START DATE: NORMAL
RAD ONC ARIA COURSE TREATMENT ELAPSED DAYS: 2
RAD ONC ARIA PLAN FRACTIONS TREATED TO DATE: 3
RAD ONC ARIA PLAN ID: NORMAL
RAD ONC ARIA PLAN PRESCRIBED DOSE PER FRACTION: 2.66 GY
RAD ONC ARIA PLAN PRIMARY REFERENCE POINT: NORMAL
RAD ONC ARIA PLAN TOTAL FRACTIONS PRESCRIBED: 16
RAD ONC ARIA PLAN TOTAL PRESCRIBED DOSE: 4256 CGY
RAD ONC ARIA REFERENCE POINT DOSAGE GIVEN TO DATE: 7.98 GY
RAD ONC ARIA REFERENCE POINT ID: NORMAL
RAD ONC ARIA REFERENCE POINT SESSION DOSAGE GIVEN: 2.66 GY

## 2023-10-27 PROCEDURE — 77412 RADIATION TX DELIVERY LVL 3: CPT

## 2023-10-30 ENCOUNTER — HOSPITAL ENCOUNTER (OUTPATIENT)
Facility: HOSPITAL | Age: 68
Setting detail: RECURRING SERIES
Discharge: HOME | End: 2023-11-02
Attending: RADIOLOGY
Payer: MEDICARE

## 2023-10-30 LAB
RAD ONC ARIA COURSE FIRST TREATMENT DATE: NORMAL
RAD ONC ARIA COURSE ID: NORMAL
RAD ONC ARIA COURSE INTENT: NORMAL
RAD ONC ARIA COURSE LAST TREATMENT DATE: NORMAL
RAD ONC ARIA COURSE SESSION NUMBER: 4
RAD ONC ARIA COURSE START DATE: NORMAL
RAD ONC ARIA COURSE TREATMENT ELAPSED DAYS: 5
RAD ONC ARIA PLAN FRACTIONS TREATED TO DATE: 4
RAD ONC ARIA PLAN ID: NORMAL
RAD ONC ARIA PLAN PRESCRIBED DOSE PER FRACTION: 2.66 GY
RAD ONC ARIA PLAN PRIMARY REFERENCE POINT: NORMAL
RAD ONC ARIA PLAN TOTAL FRACTIONS PRESCRIBED: 16
RAD ONC ARIA PLAN TOTAL PRESCRIBED DOSE: 4256 CGY
RAD ONC ARIA REFERENCE POINT DOSAGE GIVEN TO DATE: 10.64 GY
RAD ONC ARIA REFERENCE POINT ID: NORMAL
RAD ONC ARIA REFERENCE POINT SESSION DOSAGE GIVEN: 2.66 GY

## 2023-10-30 PROCEDURE — 77412 RADIATION TX DELIVERY LVL 3: CPT

## 2023-10-31 ENCOUNTER — HOSPITAL ENCOUNTER (OUTPATIENT)
Facility: HOSPITAL | Age: 68
Setting detail: RECURRING SERIES
Discharge: HOME | End: 2023-11-03
Attending: RADIOLOGY
Payer: MEDICARE

## 2023-10-31 LAB
RAD ONC ARIA COURSE FIRST TREATMENT DATE: NORMAL
RAD ONC ARIA COURSE ID: NORMAL
RAD ONC ARIA COURSE INTENT: NORMAL
RAD ONC ARIA COURSE LAST TREATMENT DATE: NORMAL
RAD ONC ARIA COURSE SESSION NUMBER: 5
RAD ONC ARIA COURSE START DATE: NORMAL
RAD ONC ARIA COURSE TREATMENT ELAPSED DAYS: 6
RAD ONC ARIA PLAN FRACTIONS TREATED TO DATE: 5
RAD ONC ARIA PLAN ID: NORMAL
RAD ONC ARIA PLAN PRESCRIBED DOSE PER FRACTION: 2.66 GY
RAD ONC ARIA PLAN PRIMARY REFERENCE POINT: NORMAL
RAD ONC ARIA PLAN TOTAL FRACTIONS PRESCRIBED: 16
RAD ONC ARIA PLAN TOTAL PRESCRIBED DOSE: 4256 CGY
RAD ONC ARIA REFERENCE POINT DOSAGE GIVEN TO DATE: 13.3 GY
RAD ONC ARIA REFERENCE POINT ID: NORMAL
RAD ONC ARIA REFERENCE POINT SESSION DOSAGE GIVEN: 2.66 GY

## 2023-10-31 PROCEDURE — 77336 RADIATION PHYSICS CONSULT: CPT

## 2023-10-31 PROCEDURE — 77412 RADIATION TX DELIVERY LVL 3: CPT

## 2023-11-01 ENCOUNTER — HOSPITAL ENCOUNTER (OUTPATIENT)
Facility: HOSPITAL | Age: 68
Setting detail: RECURRING SERIES
Discharge: HOME | End: 2023-11-04
Attending: RADIOLOGY
Payer: MEDICARE

## 2023-11-01 LAB
RAD ONC ARIA COURSE FIRST TREATMENT DATE: NORMAL
RAD ONC ARIA COURSE ID: NORMAL
RAD ONC ARIA COURSE INTENT: NORMAL
RAD ONC ARIA COURSE LAST TREATMENT DATE: NORMAL
RAD ONC ARIA COURSE SESSION NUMBER: 6
RAD ONC ARIA COURSE START DATE: NORMAL
RAD ONC ARIA COURSE TREATMENT ELAPSED DAYS: 7
RAD ONC ARIA PLAN FRACTIONS TREATED TO DATE: 6
RAD ONC ARIA PLAN ID: NORMAL
RAD ONC ARIA PLAN PRESCRIBED DOSE PER FRACTION: 2.66 GY
RAD ONC ARIA PLAN PRIMARY REFERENCE POINT: NORMAL
RAD ONC ARIA PLAN TOTAL FRACTIONS PRESCRIBED: 16
RAD ONC ARIA PLAN TOTAL PRESCRIBED DOSE: 4256 CGY
RAD ONC ARIA REFERENCE POINT DOSAGE GIVEN TO DATE: 15.96 GY
RAD ONC ARIA REFERENCE POINT ID: NORMAL
RAD ONC ARIA REFERENCE POINT SESSION DOSAGE GIVEN: 2.66 GY

## 2023-11-01 PROCEDURE — 77417 THER RADIOLOGY PORT IMAGE(S): CPT

## 2023-11-01 PROCEDURE — 77412 RADIATION TX DELIVERY LVL 3: CPT

## 2023-11-02 ENCOUNTER — HOSPITAL ENCOUNTER (OUTPATIENT)
Facility: HOSPITAL | Age: 68
Setting detail: RECURRING SERIES
Discharge: HOME | End: 2023-11-05
Attending: RADIOLOGY
Payer: MEDICARE

## 2023-11-02 LAB
RAD ONC ARIA COURSE FIRST TREATMENT DATE: NORMAL
RAD ONC ARIA COURSE ID: NORMAL
RAD ONC ARIA COURSE INTENT: NORMAL
RAD ONC ARIA COURSE LAST TREATMENT DATE: NORMAL
RAD ONC ARIA COURSE SESSION NUMBER: 7
RAD ONC ARIA COURSE START DATE: NORMAL
RAD ONC ARIA COURSE TREATMENT ELAPSED DAYS: 8
RAD ONC ARIA PLAN FRACTIONS TREATED TO DATE: 7
RAD ONC ARIA PLAN ID: NORMAL
RAD ONC ARIA PLAN PRESCRIBED DOSE PER FRACTION: 2.66 GY
RAD ONC ARIA PLAN PRIMARY REFERENCE POINT: NORMAL
RAD ONC ARIA PLAN TOTAL FRACTIONS PRESCRIBED: 16
RAD ONC ARIA PLAN TOTAL PRESCRIBED DOSE: 4256 CGY
RAD ONC ARIA REFERENCE POINT DOSAGE GIVEN TO DATE: 18.62 GY
RAD ONC ARIA REFERENCE POINT ID: NORMAL
RAD ONC ARIA REFERENCE POINT SESSION DOSAGE GIVEN: 2.66 GY

## 2023-11-02 PROCEDURE — 77412 RADIATION TX DELIVERY LVL 3: CPT

## 2023-11-03 ENCOUNTER — HOSPITAL ENCOUNTER (OUTPATIENT)
Facility: HOSPITAL | Age: 68
Setting detail: RECURRING SERIES
Discharge: HOME | End: 2023-11-06
Attending: RADIOLOGY
Payer: MEDICARE

## 2023-11-03 LAB
RAD ONC ARIA COURSE FIRST TREATMENT DATE: NORMAL
RAD ONC ARIA COURSE ID: NORMAL
RAD ONC ARIA COURSE INTENT: NORMAL
RAD ONC ARIA COURSE LAST TREATMENT DATE: NORMAL
RAD ONC ARIA COURSE SESSION NUMBER: 8
RAD ONC ARIA COURSE START DATE: NORMAL
RAD ONC ARIA COURSE TREATMENT ELAPSED DAYS: 9
RAD ONC ARIA PLAN FRACTIONS TREATED TO DATE: 8
RAD ONC ARIA PLAN ID: NORMAL
RAD ONC ARIA PLAN PRESCRIBED DOSE PER FRACTION: 2.66 GY
RAD ONC ARIA PLAN PRIMARY REFERENCE POINT: NORMAL
RAD ONC ARIA PLAN TOTAL FRACTIONS PRESCRIBED: 16
RAD ONC ARIA PLAN TOTAL PRESCRIBED DOSE: 4256 CGY
RAD ONC ARIA REFERENCE POINT DOSAGE GIVEN TO DATE: 21.28 GY
RAD ONC ARIA REFERENCE POINT ID: NORMAL
RAD ONC ARIA REFERENCE POINT SESSION DOSAGE GIVEN: 2.66 GY

## 2023-11-03 PROCEDURE — 77412 RADIATION TX DELIVERY LVL 3: CPT

## 2023-11-06 ENCOUNTER — HOSPITAL ENCOUNTER (OUTPATIENT)
Facility: HOSPITAL | Age: 68
Setting detail: RECURRING SERIES
Discharge: HOME | End: 2023-11-09
Attending: RADIOLOGY
Payer: MEDICARE

## 2023-11-06 LAB
RAD ONC ARIA COURSE FIRST TREATMENT DATE: NORMAL
RAD ONC ARIA COURSE ID: NORMAL
RAD ONC ARIA COURSE INTENT: NORMAL
RAD ONC ARIA COURSE LAST TREATMENT DATE: NORMAL
RAD ONC ARIA COURSE SESSION NUMBER: 9
RAD ONC ARIA COURSE START DATE: NORMAL
RAD ONC ARIA COURSE TREATMENT ELAPSED DAYS: 12
RAD ONC ARIA PLAN FRACTIONS TREATED TO DATE: 9
RAD ONC ARIA PLAN ID: NORMAL
RAD ONC ARIA PLAN PRESCRIBED DOSE PER FRACTION: 2.66 GY
RAD ONC ARIA PLAN PRIMARY REFERENCE POINT: NORMAL
RAD ONC ARIA PLAN TOTAL FRACTIONS PRESCRIBED: 16
RAD ONC ARIA PLAN TOTAL PRESCRIBED DOSE: 4256 CGY
RAD ONC ARIA REFERENCE POINT DOSAGE GIVEN TO DATE: 23.94 GY
RAD ONC ARIA REFERENCE POINT ID: NORMAL
RAD ONC ARIA REFERENCE POINT SESSION DOSAGE GIVEN: 2.66 GY

## 2023-11-06 PROCEDURE — 77412 RADIATION TX DELIVERY LVL 3: CPT

## 2023-11-07 ENCOUNTER — HOSPITAL ENCOUNTER (OUTPATIENT)
Facility: HOSPITAL | Age: 68
Setting detail: RECURRING SERIES
Discharge: HOME | End: 2023-11-10
Attending: RADIOLOGY
Payer: MEDICARE

## 2023-11-07 LAB
RAD ONC ARIA COURSE FIRST TREATMENT DATE: NORMAL
RAD ONC ARIA COURSE ID: NORMAL
RAD ONC ARIA COURSE INTENT: NORMAL
RAD ONC ARIA COURSE LAST TREATMENT DATE: NORMAL
RAD ONC ARIA COURSE SESSION NUMBER: 10
RAD ONC ARIA COURSE START DATE: NORMAL
RAD ONC ARIA COURSE TREATMENT ELAPSED DAYS: 13
RAD ONC ARIA PLAN FRACTIONS TREATED TO DATE: 10
RAD ONC ARIA PLAN ID: NORMAL
RAD ONC ARIA PLAN PRESCRIBED DOSE PER FRACTION: 2.66 GY
RAD ONC ARIA PLAN PRIMARY REFERENCE POINT: NORMAL
RAD ONC ARIA PLAN TOTAL FRACTIONS PRESCRIBED: 16
RAD ONC ARIA PLAN TOTAL PRESCRIBED DOSE: 4256 CGY
RAD ONC ARIA REFERENCE POINT DOSAGE GIVEN TO DATE: 26.6 GY
RAD ONC ARIA REFERENCE POINT ID: NORMAL
RAD ONC ARIA REFERENCE POINT SESSION DOSAGE GIVEN: 2.66 GY

## 2023-11-07 PROCEDURE — 77336 RADIATION PHYSICS CONSULT: CPT

## 2023-11-07 PROCEDURE — 77412 RADIATION TX DELIVERY LVL 3: CPT

## 2023-11-08 ENCOUNTER — HOSPITAL ENCOUNTER (OUTPATIENT)
Facility: HOSPITAL | Age: 68
Setting detail: RECURRING SERIES
Discharge: HOME | End: 2023-11-11
Attending: RADIOLOGY
Payer: MEDICARE

## 2023-11-08 LAB
RAD ONC ARIA COURSE FIRST TREATMENT DATE: NORMAL
RAD ONC ARIA COURSE ID: NORMAL
RAD ONC ARIA COURSE INTENT: NORMAL
RAD ONC ARIA COURSE LAST TREATMENT DATE: NORMAL
RAD ONC ARIA COURSE SESSION NUMBER: 11
RAD ONC ARIA COURSE START DATE: NORMAL
RAD ONC ARIA COURSE TREATMENT ELAPSED DAYS: 14
RAD ONC ARIA PLAN FRACTIONS TREATED TO DATE: 11
RAD ONC ARIA PLAN ID: NORMAL
RAD ONC ARIA PLAN PRESCRIBED DOSE PER FRACTION: 2.66 GY
RAD ONC ARIA PLAN PRIMARY REFERENCE POINT: NORMAL
RAD ONC ARIA PLAN TOTAL FRACTIONS PRESCRIBED: 16
RAD ONC ARIA PLAN TOTAL PRESCRIBED DOSE: 4256 CGY
RAD ONC ARIA REFERENCE POINT DOSAGE GIVEN TO DATE: 29.26 GY
RAD ONC ARIA REFERENCE POINT ID: NORMAL
RAD ONC ARIA REFERENCE POINT SESSION DOSAGE GIVEN: 2.66 GY

## 2023-11-08 PROCEDURE — 77412 RADIATION TX DELIVERY LVL 3: CPT

## 2023-11-09 ENCOUNTER — APPOINTMENT (OUTPATIENT)
Facility: HOSPITAL | Age: 68
End: 2023-11-09
Attending: RADIOLOGY
Payer: MEDICARE

## 2023-11-09 ENCOUNTER — HOSPITAL ENCOUNTER (OUTPATIENT)
Facility: HOSPITAL | Age: 68
Setting detail: RECURRING SERIES
Discharge: HOME | End: 2023-11-12
Attending: RADIOLOGY
Payer: MEDICARE

## 2023-11-09 LAB
RAD ONC ARIA COURSE FIRST TREATMENT DATE: NORMAL
RAD ONC ARIA COURSE ID: NORMAL
RAD ONC ARIA COURSE INTENT: NORMAL
RAD ONC ARIA COURSE LAST TREATMENT DATE: NORMAL
RAD ONC ARIA COURSE SESSION NUMBER: 12
RAD ONC ARIA COURSE START DATE: NORMAL
RAD ONC ARIA COURSE TREATMENT ELAPSED DAYS: 15
RAD ONC ARIA PLAN FRACTIONS TREATED TO DATE: 12
RAD ONC ARIA PLAN ID: NORMAL
RAD ONC ARIA PLAN PRESCRIBED DOSE PER FRACTION: 2.66 GY
RAD ONC ARIA PLAN PRIMARY REFERENCE POINT: NORMAL
RAD ONC ARIA PLAN TOTAL FRACTIONS PRESCRIBED: 16
RAD ONC ARIA PLAN TOTAL PRESCRIBED DOSE: 4256 CGY
RAD ONC ARIA REFERENCE POINT DOSAGE GIVEN TO DATE: 31.92 GY
RAD ONC ARIA REFERENCE POINT ID: NORMAL
RAD ONC ARIA REFERENCE POINT SESSION DOSAGE GIVEN: 2.66 GY

## 2023-11-09 PROCEDURE — 77412 RADIATION TX DELIVERY LVL 3: CPT

## 2023-11-10 ENCOUNTER — HOSPITAL ENCOUNTER (OUTPATIENT)
Facility: HOSPITAL | Age: 68
Setting detail: RECURRING SERIES
Discharge: HOME | End: 2023-11-13
Attending: RADIOLOGY
Payer: MEDICARE

## 2023-11-10 LAB
RAD ONC ARIA COURSE FIRST TREATMENT DATE: NORMAL
RAD ONC ARIA COURSE ID: NORMAL
RAD ONC ARIA COURSE INTENT: NORMAL
RAD ONC ARIA COURSE LAST TREATMENT DATE: NORMAL
RAD ONC ARIA COURSE SESSION NUMBER: 13
RAD ONC ARIA COURSE START DATE: NORMAL
RAD ONC ARIA COURSE TREATMENT ELAPSED DAYS: 16
RAD ONC ARIA PLAN FRACTIONS TREATED TO DATE: 13
RAD ONC ARIA PLAN ID: NORMAL
RAD ONC ARIA PLAN PRESCRIBED DOSE PER FRACTION: 2.66 GY
RAD ONC ARIA PLAN PRIMARY REFERENCE POINT: NORMAL
RAD ONC ARIA PLAN TOTAL FRACTIONS PRESCRIBED: 16
RAD ONC ARIA PLAN TOTAL PRESCRIBED DOSE: 4256 CGY
RAD ONC ARIA REFERENCE POINT DOSAGE GIVEN TO DATE: 34.58 GY
RAD ONC ARIA REFERENCE POINT ID: NORMAL
RAD ONC ARIA REFERENCE POINT SESSION DOSAGE GIVEN: 2.66 GY

## 2023-11-10 PROCEDURE — 77412 RADIATION TX DELIVERY LVL 3: CPT

## 2023-11-13 ENCOUNTER — HOSPITAL ENCOUNTER (OUTPATIENT)
Facility: HOSPITAL | Age: 68
Setting detail: RECURRING SERIES
Discharge: HOME | End: 2023-11-16
Attending: RADIOLOGY
Payer: MEDICARE

## 2023-11-13 LAB
RAD ONC ARIA COURSE FIRST TREATMENT DATE: NORMAL
RAD ONC ARIA COURSE ID: NORMAL
RAD ONC ARIA COURSE INTENT: NORMAL
RAD ONC ARIA COURSE LAST TREATMENT DATE: NORMAL
RAD ONC ARIA COURSE SESSION NUMBER: 14
RAD ONC ARIA COURSE START DATE: NORMAL
RAD ONC ARIA COURSE TREATMENT ELAPSED DAYS: 19
RAD ONC ARIA PLAN FRACTIONS TREATED TO DATE: 14
RAD ONC ARIA PLAN ID: NORMAL
RAD ONC ARIA PLAN PRESCRIBED DOSE PER FRACTION: 2.66 GY
RAD ONC ARIA PLAN PRIMARY REFERENCE POINT: NORMAL
RAD ONC ARIA PLAN TOTAL FRACTIONS PRESCRIBED: 16
RAD ONC ARIA PLAN TOTAL PRESCRIBED DOSE: 4256 CGY
RAD ONC ARIA REFERENCE POINT DOSAGE GIVEN TO DATE: 37.24 GY
RAD ONC ARIA REFERENCE POINT ID: NORMAL
RAD ONC ARIA REFERENCE POINT SESSION DOSAGE GIVEN: 2.66 GY

## 2023-11-13 PROCEDURE — 77412 RADIATION TX DELIVERY LVL 3: CPT

## 2023-11-14 ENCOUNTER — HOSPITAL ENCOUNTER (OUTPATIENT)
Facility: HOSPITAL | Age: 68
Setting detail: RECURRING SERIES
Discharge: HOME | End: 2023-11-17
Attending: RADIOLOGY
Payer: MEDICARE

## 2023-11-14 LAB
RAD ONC ARIA COURSE FIRST TREATMENT DATE: NORMAL
RAD ONC ARIA COURSE ID: NORMAL
RAD ONC ARIA COURSE INTENT: NORMAL
RAD ONC ARIA COURSE LAST TREATMENT DATE: NORMAL
RAD ONC ARIA COURSE SESSION NUMBER: 15
RAD ONC ARIA COURSE START DATE: NORMAL
RAD ONC ARIA COURSE TREATMENT ELAPSED DAYS: 20
RAD ONC ARIA PLAN FRACTIONS TREATED TO DATE: 15
RAD ONC ARIA PLAN ID: NORMAL
RAD ONC ARIA PLAN PRESCRIBED DOSE PER FRACTION: 2.66 GY
RAD ONC ARIA PLAN PRIMARY REFERENCE POINT: NORMAL
RAD ONC ARIA PLAN TOTAL FRACTIONS PRESCRIBED: 16
RAD ONC ARIA PLAN TOTAL PRESCRIBED DOSE: 4256 CGY
RAD ONC ARIA REFERENCE POINT DOSAGE GIVEN TO DATE: 39.9 GY
RAD ONC ARIA REFERENCE POINT ID: NORMAL
RAD ONC ARIA REFERENCE POINT SESSION DOSAGE GIVEN: 2.66 GY

## 2023-11-14 PROCEDURE — 77412 RADIATION TX DELIVERY LVL 3: CPT

## 2023-11-14 PROCEDURE — 77336 RADIATION PHYSICS CONSULT: CPT

## 2023-11-15 ENCOUNTER — HOSPITAL ENCOUNTER (OUTPATIENT)
Facility: HOSPITAL | Age: 68
Setting detail: RECURRING SERIES
Discharge: HOME | End: 2023-11-18
Attending: RADIOLOGY
Payer: MEDICARE

## 2023-11-15 LAB
RAD ONC ARIA COURSE FIRST TREATMENT DATE: NORMAL
RAD ONC ARIA COURSE ID: NORMAL
RAD ONC ARIA COURSE INTENT: NORMAL
RAD ONC ARIA COURSE LAST TREATMENT DATE: NORMAL
RAD ONC ARIA COURSE SESSION NUMBER: 16
RAD ONC ARIA COURSE START DATE: NORMAL
RAD ONC ARIA COURSE TREATMENT ELAPSED DAYS: 21
RAD ONC ARIA PLAN FRACTIONS TREATED TO DATE: 16
RAD ONC ARIA PLAN ID: NORMAL
RAD ONC ARIA PLAN PRESCRIBED DOSE PER FRACTION: 2.66 GY
RAD ONC ARIA PLAN PRIMARY REFERENCE POINT: NORMAL
RAD ONC ARIA PLAN TOTAL FRACTIONS PRESCRIBED: 16
RAD ONC ARIA PLAN TOTAL PRESCRIBED DOSE: 4256 CGY
RAD ONC ARIA REFERENCE POINT DOSAGE GIVEN TO DATE: 42.56 GY
RAD ONC ARIA REFERENCE POINT ID: NORMAL
RAD ONC ARIA REFERENCE POINT SESSION DOSAGE GIVEN: 2.66 GY

## 2023-11-15 PROCEDURE — 77307 TELETHX ISODOSE PLAN CPLX: CPT

## 2023-11-15 PROCEDURE — 77412 RADIATION TX DELIVERY LVL 3: CPT

## 2023-11-15 PROCEDURE — 77334 RADIATION TREATMENT AID(S): CPT

## 2023-11-16 ENCOUNTER — HOSPITAL ENCOUNTER (OUTPATIENT)
Facility: HOSPITAL | Age: 68
Setting detail: RECURRING SERIES
Discharge: HOME | End: 2023-11-19
Attending: RADIOLOGY
Payer: MEDICARE

## 2023-11-16 LAB
RAD ONC ARIA COURSE FIRST TREATMENT DATE: NORMAL
RAD ONC ARIA COURSE ID: NORMAL
RAD ONC ARIA COURSE INTENT: NORMAL
RAD ONC ARIA COURSE LAST TREATMENT DATE: NORMAL
RAD ONC ARIA COURSE SESSION NUMBER: 17
RAD ONC ARIA COURSE START DATE: NORMAL
RAD ONC ARIA COURSE TREATMENT ELAPSED DAYS: 22
RAD ONC ARIA PLAN FRACTIONS TREATED TO DATE: 1
RAD ONC ARIA PLAN ID: NORMAL
RAD ONC ARIA PLAN PRESCRIBED DOSE PER FRACTION: 2.5 GY
RAD ONC ARIA PLAN PRIMARY REFERENCE POINT: NORMAL
RAD ONC ARIA PLAN TOTAL FRACTIONS PRESCRIBED: 4
RAD ONC ARIA PLAN TOTAL PRESCRIBED DOSE: 1000 CGY
RAD ONC ARIA REFERENCE POINT DOSAGE GIVEN TO DATE: 2.5 GY
RAD ONC ARIA REFERENCE POINT ID: NORMAL
RAD ONC ARIA REFERENCE POINT SESSION DOSAGE GIVEN: 2.5 GY

## 2023-11-16 PROCEDURE — 77412 RADIATION TX DELIVERY LVL 3: CPT

## 2023-11-16 PROCEDURE — 77280 THER RAD SIMULAJ FIELD SMPL: CPT

## 2023-11-17 ENCOUNTER — HOSPITAL ENCOUNTER (OUTPATIENT)
Facility: HOSPITAL | Age: 68
Setting detail: RECURRING SERIES
Discharge: HOME | End: 2023-11-20
Attending: RADIOLOGY
Payer: MEDICARE

## 2023-11-17 PROCEDURE — 77412 RADIATION TX DELIVERY LVL 3: CPT

## 2023-11-20 ENCOUNTER — HOSPITAL ENCOUNTER (OUTPATIENT)
Facility: HOSPITAL | Age: 68
Setting detail: RECURRING SERIES
Discharge: HOME | End: 2023-11-23
Attending: RADIOLOGY
Payer: MEDICARE

## 2023-11-20 LAB
RAD ONC ARIA COURSE FIRST TREATMENT DATE: NORMAL
RAD ONC ARIA COURSE ID: NORMAL
RAD ONC ARIA COURSE INTENT: NORMAL
RAD ONC ARIA COURSE LAST TREATMENT DATE: NORMAL
RAD ONC ARIA COURSE SESSION NUMBER: 18
RAD ONC ARIA COURSE START DATE: NORMAL
RAD ONC ARIA COURSE TREATMENT ELAPSED DAYS: 26
RAD ONC ARIA PLAN FRACTIONS TREATED TO DATE: 3
RAD ONC ARIA PLAN ID: NORMAL
RAD ONC ARIA PLAN PRESCRIBED DOSE PER FRACTION: 2.5 GY
RAD ONC ARIA PLAN PRIMARY REFERENCE POINT: NORMAL
RAD ONC ARIA PLAN TOTAL FRACTIONS PRESCRIBED: 4
RAD ONC ARIA PLAN TOTAL PRESCRIBED DOSE: 1000 CGY
RAD ONC ARIA REFERENCE POINT DOSAGE GIVEN TO DATE: 7.5 GY
RAD ONC ARIA REFERENCE POINT ID: NORMAL
RAD ONC ARIA REFERENCE POINT SESSION DOSAGE GIVEN: 3.33 GY

## 2023-11-20 PROCEDURE — 77412 RADIATION TX DELIVERY LVL 3: CPT

## 2023-11-21 ENCOUNTER — HOSPITAL ENCOUNTER (OUTPATIENT)
Facility: HOSPITAL | Age: 68
Setting detail: RECURRING SERIES
Discharge: HOME | End: 2023-11-24
Attending: RADIOLOGY
Payer: MEDICARE

## 2023-11-21 LAB
RAD ONC ARIA COURSE FIRST TREATMENT DATE: NORMAL
RAD ONC ARIA COURSE ID: NORMAL
RAD ONC ARIA COURSE INTENT: NORMAL
RAD ONC ARIA COURSE LAST TREATMENT DATE: NORMAL
RAD ONC ARIA COURSE SESSION NUMBER: 19
RAD ONC ARIA COURSE START DATE: NORMAL
RAD ONC ARIA COURSE TREATMENT ELAPSED DAYS: 27
RAD ONC ARIA PLAN FRACTIONS TREATED TO DATE: 4
RAD ONC ARIA PLAN ID: NORMAL
RAD ONC ARIA PLAN PRESCRIBED DOSE PER FRACTION: 2.5 GY
RAD ONC ARIA PLAN PRIMARY REFERENCE POINT: NORMAL
RAD ONC ARIA PLAN TOTAL FRACTIONS PRESCRIBED: 4
RAD ONC ARIA PLAN TOTAL PRESCRIBED DOSE: 1000 CGY
RAD ONC ARIA REFERENCE POINT DOSAGE GIVEN TO DATE: 10 GY
RAD ONC ARIA REFERENCE POINT ID: NORMAL
RAD ONC ARIA REFERENCE POINT SESSION DOSAGE GIVEN: 2.5 GY

## 2023-11-21 PROCEDURE — 77336 RADIATION PHYSICS CONSULT: CPT

## 2023-11-21 PROCEDURE — 77412 RADIATION TX DELIVERY LVL 3: CPT

## 2023-12-13 ENCOUNTER — HOSPITAL ENCOUNTER (OUTPATIENT)
Facility: HOSPITAL | Age: 68
Discharge: HOME | End: 2023-12-16

## 2023-12-13 ENCOUNTER — OFFICE VISIT (OUTPATIENT)
Age: 68
End: 2023-12-13
Payer: MEDICARE

## 2023-12-13 VITALS
DIASTOLIC BLOOD PRESSURE: 68 MMHG | BODY MASS INDEX: 27.17 KG/M2 | HEART RATE: 101 BPM | OXYGEN SATURATION: 98 % | WEIGHT: 158.31 LBS | SYSTOLIC BLOOD PRESSURE: 139 MMHG

## 2023-12-13 DIAGNOSIS — I10 HYPERTENSION, UNSPECIFIED TYPE: ICD-10-CM

## 2023-12-13 DIAGNOSIS — R60.9 EDEMA, UNSPECIFIED TYPE: Primary | ICD-10-CM

## 2023-12-13 LAB — LABCORP SPECIMEN COLLECTION: NORMAL

## 2023-12-13 PROCEDURE — 3078F DIAST BP <80 MM HG: CPT | Performed by: INTERNAL MEDICINE

## 2023-12-13 PROCEDURE — 3075F SYST BP GE 130 - 139MM HG: CPT | Performed by: INTERNAL MEDICINE

## 2023-12-13 PROCEDURE — 93000 ELECTROCARDIOGRAM COMPLETE: CPT | Performed by: INTERNAL MEDICINE

## 2023-12-13 PROCEDURE — 99204 OFFICE O/P NEW MOD 45 MIN: CPT | Performed by: INTERNAL MEDICINE

## 2023-12-13 PROCEDURE — 1123F ACP DISCUSS/DSCN MKR DOCD: CPT | Performed by: INTERNAL MEDICINE

## 2023-12-13 RX ORDER — SPIRONOLACTONE 25 MG/1
25 TABLET ORAL DAILY
COMMUNITY
Start: 2023-11-16

## 2023-12-13 RX ORDER — CYCLOBENZAPRINE HCL 5 MG
5 TABLET ORAL NIGHTLY PRN
COMMUNITY
Start: 2023-11-16

## 2023-12-13 RX ORDER — ALOGLIPTIN AND METFORMIN HYDROCHLORIDE 12.5; 1 MG/1; MG/1
1 TABLET, FILM COATED ORAL 2 TIMES DAILY
COMMUNITY
Start: 2023-11-22

## 2023-12-13 RX ORDER — IBUPROFEN 800 MG/1
800 TABLET ORAL EVERY 6 HOURS
COMMUNITY
Start: 2023-11-16

## 2023-12-13 RX ORDER — LETROZOLE 2.5 MG/1
2.5 TABLET, FILM COATED ORAL DAILY
COMMUNITY

## 2023-12-13 ASSESSMENT — ENCOUNTER SYMPTOMS
ABDOMINAL PAIN: 0
CONSTIPATION: 0
WHEEZING: 0
COLOR CHANGE: 0
BLOOD IN STOOL: 0
CHEST TIGHTNESS: 0
APNEA: 0
COUGH: 0
DIARRHEA: 0
SHORTNESS OF BREATH: 0
NAUSEA: 0

## 2023-12-13 NOTE — PROGRESS NOTES
Have you had Fatigue? No   2. Have you had have you had Chest Pain? No    3. Have you had Dyspnea (SOB) ? No   4. Have you had Orthopnea? No  5. Have you had PND? No   6. Have you had leg swelling? Yes, intermittent BLE onset 6 months ago. 7.    Have you had any weight gain? Yes, 6lb wt. gain since September 2023.   8. Have you had any palpitations? No   9. Have you had any syncope? No  10. Do you have any wounds on legs?  No
Provider, MD Amna   amLODIPine (NORVASC) 10 MG tablet Take 1 tablet by mouth daily    ProviderAmna MD   canagliflozin (INVOKANA) 300 MG TABS tablet 1 tablet before the first meal of the day Orally Once a day **Please see notes for [facility] Portal Billing for 90 days  Patient not taking: Reported on 9/27/2023    Amna Drummond MD   Semaglutide (RYBELSUS) 3 MG TABS 1 tablet at least 30 minutes before first food, beverage or other oral medicine of the day Orally Once a day for 30 days  Patient not taking: Reported on 9/27/2023    ProviderAmna MD   Lancets MISC Use as directed to check blood glucose levels 5/22/19   Automatic Reconciliation, Ar   aspirin 81 MG chewable tablet Take 1 tablet by mouth daily  Patient not taking: Reported on 9/27/2023    Automatic Reconciliation, Ar   glipiZIDE (GLUCOTROL) 5 MG tablet TAKE 1 TABLET BY MOUTH THREE TIMES DAILY BEFORE BREAKFAST, LUNCH, AND DINNER  Patient not taking: Reported on 8/25/2023 1/23/19   Automatic Reconciliation, Ar   indapamide (LOZOL) 1.25 MG tablet Take 1 tablet by mouth daily  Patient not taking: Reported on 8/25/2023 7/24/19   Automatic Reconciliation, Ar   Lidocaine-Hydrocortisone Ace 3-0.5 % KIT Apply topically 2 times daily 8/20/19   Automatic Reconciliation, Ar   metFORMIN (GLUCOPHAGE) 500 MG tablet Take 2 tablets by mouth 2 times daily (with meals) 1/23/19   Automatic Reconciliation, Ar   rosuvastatin (CRESTOR) 5 MG tablet Take 1 tablet by mouth 5/22/19   Automatic Reconciliation, Ar       No results found for: \"LIPIDPAN\", \"BMP\", \"CMP\"     There were no vitals taken for this visit. HPI    Review of Systems   Constitutional:  Positive for unexpected weight change. Negative for activity change, appetite change, diaphoresis and fatigue. Eyes:  Negative for visual disturbance. Respiratory:  Negative for apnea, cough, chest tightness, shortness of breath and wheezing. Cardiovascular:  Positive for leg swelling.  Negative for

## 2023-12-14 LAB
ALBUMIN SERPL-MCNC: 4.5 G/DL (ref 3.9–4.9)
ALBUMIN/GLOB SERPL: 1.8 {RATIO} (ref 1.2–2.2)
ALP SERPL-CCNC: 78 IU/L (ref 44–121)
ALT SERPL-CCNC: 14 IU/L (ref 0–32)
AST SERPL-CCNC: 16 IU/L (ref 0–40)
BILIRUB SERPL-MCNC: <0.2 MG/DL (ref 0–1.2)
BUN SERPL-MCNC: 24 MG/DL (ref 8–27)
BUN/CREAT SERPL: 18 (ref 12–28)
CALCIUM SERPL-MCNC: 9.9 MG/DL (ref 8.7–10.3)
CHLORIDE SERPL-SCNC: 105 MMOL/L (ref 96–106)
CO2 SERPL-SCNC: 24 MMOL/L (ref 20–29)
CREAT SERPL-MCNC: 1.33 MG/DL (ref 0.57–1)
EGFRCR SERPLBLD CKD-EPI 2021: 43 ML/MIN/1.73
GLOBULIN SER CALC-MCNC: 2.5 G/DL (ref 1.5–4.5)
GLUCOSE SERPL-MCNC: 156 MG/DL (ref 70–99)
POTASSIUM SERPL-SCNC: 4.7 MMOL/L (ref 3.5–5.2)
PROT SERPL-MCNC: 7 G/DL (ref 6–8.5)
SODIUM SERPL-SCNC: 142 MMOL/L (ref 134–144)
SPECIMEN STATUS REPORT: NORMAL
SPECIMEN STATUS REPORT: NORMAL

## 2023-12-15 ENCOUNTER — TELEPHONE (OUTPATIENT)
Age: 68
End: 2023-12-15

## 2023-12-15 NOTE — TELEPHONE ENCOUNTER
Spoke with patient whom voiced understanding of message below per Dr. Magana Blend:    ----- Message from Vesna Piña MD sent at 12/14/2023  6:21 PM EST -----  Please let patient know to hold aldactone, creatinine increase, stop taking it.

## 2024-01-02 ENCOUNTER — APPOINTMENT (OUTPATIENT)
Facility: HOSPITAL | Age: 69
End: 2024-01-02
Attending: RADIOLOGY
Payer: MEDICARE

## 2024-01-04 ENCOUNTER — APPOINTMENT (OUTPATIENT)
Facility: HOSPITAL | Age: 69
End: 2024-01-04
Attending: RADIOLOGY
Payer: MEDICARE

## 2024-01-08 ENCOUNTER — OFFICE VISIT (OUTPATIENT)
Age: 69
End: 2024-01-08
Payer: MEDICARE

## 2024-01-08 VITALS
SYSTOLIC BLOOD PRESSURE: 142 MMHG | OXYGEN SATURATION: 96 % | RESPIRATION RATE: 14 BRPM | HEIGHT: 64 IN | WEIGHT: 162.6 LBS | BODY MASS INDEX: 27.76 KG/M2 | TEMPERATURE: 97.5 F | DIASTOLIC BLOOD PRESSURE: 74 MMHG | HEART RATE: 63 BPM

## 2024-01-08 DIAGNOSIS — Z17.0 MALIGNANT NEOPLASM OF RIGHT BREAST IN FEMALE, ESTROGEN RECEPTOR POSITIVE, UNSPECIFIED SITE OF BREAST (HCC): Primary | ICD-10-CM

## 2024-01-08 DIAGNOSIS — Z80.3 FAMILY HISTORY OF BREAST CANCER IN MOTHER: ICD-10-CM

## 2024-01-08 DIAGNOSIS — C50.911 MALIGNANT NEOPLASM OF RIGHT BREAST IN FEMALE, ESTROGEN RECEPTOR POSITIVE, UNSPECIFIED SITE OF BREAST (HCC): Primary | ICD-10-CM

## 2024-01-08 PROCEDURE — 99214 OFFICE O/P EST MOD 30 MIN: CPT | Performed by: SURGERY

## 2024-01-08 PROCEDURE — 1123F ACP DISCUSS/DSCN MKR DOCD: CPT | Performed by: SURGERY

## 2024-01-08 PROCEDURE — 3077F SYST BP >= 140 MM HG: CPT | Performed by: SURGERY

## 2024-01-08 PROCEDURE — 3078F DIAST BP <80 MM HG: CPT | Performed by: SURGERY

## 2024-01-08 ASSESSMENT — ENCOUNTER SYMPTOMS
SHORTNESS OF BREATH: 0
CHEST TIGHTNESS: 0

## 2024-01-08 NOTE — PROGRESS NOTES
Candy Barrett is a 68 y.o. female (: 1955)     Chief Complaint   Patient presents with    Follow-up     Right breast        Medication list and allergies have been reviewed with Candy Barrett and updated as of today's date.     I have gone over all Medical, Surgical and Social History with Candy Barrett and updated/added the information accordingly.      1. Have you been to the ER, urgent care clinic since your last visit?  Hospitalized since your last visit?No    2. Have you seen or consulted any other health care providers outside of the [facility] since your last visit?  Include any pap smears or colon screening. No   
  within  the surgical specimen however the localizer tag is not visualized.  Impression: 1. Biopsy clip present within the surgical specimen.    2. Tag localizer is not identified within the 2 submitted specimen   radiographs.         Physical Exam:  BP (!) 142/74   Pulse 63   Temp 97.5 °F (36.4 °C)   Resp 14   Ht 1.626 m (5' 4\")   Wt 73.8 kg (162 lb 9.6 oz)   SpO2 96%   BMI 27.91 kg/m²  BMI: Body mass index is 27.91 kg/m².    Physical Exam  Constitutional:       Appearance: Normal appearance.   HENT:      Head: Normocephalic.   Eyes:      Extraocular Movements: Extraocular movements intact.      Conjunctiva/sclera: Conjunctivae normal.      Pupils: Pupils are equal, round, and reactive to light.   Cardiovascular:      Rate and Rhythm: Normal rate.   Chest:   Breasts:     Right: Swelling and skin change present. No bleeding, inverted nipple, mass, nipple discharge or tenderness.      Left: No swelling, bleeding, inverted nipple, mass, nipple discharge, skin change or tenderness.      Comments: Radiation changes noted to the right breast with mild lymphedema   Lymphadenopathy:      Upper Body:      Right upper body: No supraclavicular, axillary or pectoral adenopathy.      Left upper body: No supraclavicular, axillary or pectoral adenopathy.   Neurological:      General: No focal deficit present.      Mental Status: She is alert and oriented to person, place, and time. Mental status is at baseline.   Psychiatric:         Mood and Affect: Mood normal.         Behavior: Behavior normal.         Thought Content: Thought content normal.         Judgment: Judgment normal.         I have reviewed the information entered by the clinical staff and/or patient and verified it as accurate or edited where necessary.     Electronically signed by:    Jada Alves DO, MPH

## 2024-01-10 ENCOUNTER — TELEPHONE (OUTPATIENT)
Age: 69
End: 2024-01-10

## 2024-01-18 ENCOUNTER — HOSPITAL ENCOUNTER (OUTPATIENT)
Facility: HOSPITAL | Age: 69
Setting detail: RECURRING SERIES
Discharge: HOME | End: 2024-01-21
Attending: RADIOLOGY
Payer: MEDICARE

## 2024-01-18 PROCEDURE — 99213 OFFICE O/P EST LOW 20 MIN: CPT

## 2024-01-18 PROCEDURE — 99024 POSTOP FOLLOW-UP VISIT: CPT | Performed by: RADIOLOGY

## 2024-02-19 NOTE — PROGRESS NOTES
HISTORY OF PRESENT ILLNESS  Candy Barrett is a 68 y.o. female.    PMH Coronary risk equivalent DM, HTN  ----  CARDIAC STUDIES  ----    Have you had Fatigue?  No      2.   Have you had have you had Chest Pain? No   3.   Have you had Dyspnea (SOB) ? No   4.   Have you had Orthopnea? No      5.   Have you had PND? No   6.   Have you had leg swelling? No   7.    Have you had any weight gain? No     8. Have you had any palpitations? No      9. Have you had any syncope? No   10. Do you have any wounds on legs?no     Reviewed with the patient and is as such.  Reports also gets some cramps in the calves.      Family History   Problem Relation Age of Onset    Hypertension Brother     Cancer Maternal Grandmother     Breast Cancer Mother     Diabetes Mother     Hypertension Father     Thyroid Cancer Sister        Past Medical History:   Diagnosis Date    Arthritis     Diabetes (HCC)     Diabetic eye exam ([facility]) 10/08/2018    Bilateral cataracts   No retinopathy    Hypertension        Past Surgical History:   Procedure Laterality Date    BREAST BIOPSY Right 9/14/2023    RIGHT BREAST LOCALIZED LUMPECTOMY, RIGHT AXILLARY SENTINEL LYMPH NODE BIOPSY performed by Jada Alves DO at [facility] MAIN OR    HYSTERECTOMY (CERVIX STATUS UNKNOWN)  1980    TONSILLECTOMY      as a child    US BREAST BIOPSY W LOC DEVICE 1ST LESION RIGHT Right 8/14/2023    US BREAST BIOPSY W LOC DEVICE 1ST LESION RIGHT 8/14/2023 [facility] US       Social History     Tobacco Use    Smoking status: Never    Smokeless tobacco: Never   Substance Use Topics    Alcohol use: Not Currently       Allergies   Allergen Reactions    Diphenhydramine Rash     Hives    Hydroxyzine Pamoate Rash    Lisinopril Rash    Loratadine Rash       Prior to Admission medications    Medication Sig Start Date End Date Taking? Authorizing Provider   methocarbamol (ROBAXIN) 500 MG tablet Take 1 tablet by mouth 4 times daily as needed (Pain, spasm) 2/1/24   Mary Rizo, MAURA   vitamin D

## 2024-02-28 ENCOUNTER — OFFICE VISIT (OUTPATIENT)
Age: 69
End: 2024-02-28
Payer: MEDICARE

## 2024-02-28 VITALS
HEART RATE: 95 BPM | BODY MASS INDEX: 28.88 KG/M2 | HEIGHT: 63 IN | SYSTOLIC BLOOD PRESSURE: 137 MMHG | WEIGHT: 163 LBS | OXYGEN SATURATION: 97 % | DIASTOLIC BLOOD PRESSURE: 63 MMHG

## 2024-02-28 DIAGNOSIS — R60.0 LOWER EXTREMITY EDEMA: Primary | ICD-10-CM

## 2024-02-28 DIAGNOSIS — I10 HYPERTENSION, UNSPECIFIED TYPE: ICD-10-CM

## 2024-02-28 PROCEDURE — 3078F DIAST BP <80 MM HG: CPT | Performed by: INTERNAL MEDICINE

## 2024-02-28 PROCEDURE — 99214 OFFICE O/P EST MOD 30 MIN: CPT | Performed by: INTERNAL MEDICINE

## 2024-02-28 PROCEDURE — 1123F ACP DISCUSS/DSCN MKR DOCD: CPT | Performed by: INTERNAL MEDICINE

## 2024-02-28 PROCEDURE — 3075F SYST BP GE 130 - 139MM HG: CPT | Performed by: INTERNAL MEDICINE

## 2024-02-28 RX ORDER — GLIPIZIDE 2.5 MG/1
2.5 TABLET, EXTENDED RELEASE ORAL
COMMUNITY
Start: 2024-01-04

## 2024-02-28 RX ORDER — BLOOD PRESSURE TEST KIT
1 KIT MISCELLANEOUS 2 TIMES DAILY
Qty: 1 KIT | Refills: 0 | Status: SHIPPED | OUTPATIENT
Start: 2024-02-28

## 2024-02-28 NOTE — PROGRESS NOTES
Have you had Fatigue?  No     2.   Have you had have you had Chest Pain? No   3.   Have you had Dyspnea (SOB) ? No   4.   Have you had Orthopnea? No     5.   Have you had PND? No   6.   Have you had leg swelling? No   7.    Have you had any weight gain? No    8. Have you had any palpitations? No      9. Have you had any syncope? No   10. Do you have any wounds on legs?no

## 2024-02-28 NOTE — PATIENT INSTRUCTIONS
Mediterranean diet may also include red wine with your meal--1 glass each day for women and up to 2 glasses a day for men.  Tips for eating at home  Use herbs, spices, garlic, lemon zest, and citrus juice instead of salt to add flavor to foods.  Add avocado slices to your sandwich instead of butterfield.  Have fish for lunch or dinner instead of red meat. Brush the fish with olive oil, and broil or grill it.  Sprinkle your salad with seeds or nuts instead of cheese.  Cook with olive or canola oil instead of butter or oils that are high in saturated fat.  Switch from 2% milk or whole milk to 1% or fat-free milk.  Dip raw vegetables in a vinaigrette dressing or hummus instead of dips made from mayonnaise or sour cream.  Have a piece of fruit for dessert instead of a piece of cake. Try baked apples, or have some dried fruit.  Tips for eating out  Try broiled, grilled, baked, or poached fish instead of having it fried or breaded.  Ask your  to have your meals prepared with olive oil instead of butter.  Order dishes made with marinara sauce or sauces made from olive oil. Avoid sauces made from cream or mayonnaise.  Choose whole-grain breads, whole wheat pasta and pizza crust, brown rice, beans, and lentils.  Cut back on butter or margarine on bread. Instead, you can dip your bread in a small amount of olive oil.  Ask for a side salad or grilled vegetables instead of french fries or chips.  Where can you learn more?  Go to https://www.lifecake.net/patientEd and enter O407 to learn more about \"Learning About the Mediterranean Diet.\"  Current as of: May 9, 2022               Content Version: 13.5  © 1207-8314 [employer].   Care instructions adapted under license by [employer]. If you have questions about a medical condition or this instruction, always ask your healthcare professional. [employer] disclaims any warranty or liability for your use of this information.

## 2024-03-13 ENCOUNTER — TELEPHONE (OUTPATIENT)
Age: 69
End: 2024-03-13

## 2024-03-13 NOTE — TELEPHONE ENCOUNTER
Outside images for CT scan are in chart, for review.   Per [facility], LEANDRO for CPT 77740,  Decision ID #: [id number]

## 2024-03-18 ENCOUNTER — HOSPITAL ENCOUNTER (OUTPATIENT)
Facility: HOSPITAL | Age: 69
Discharge: HOME | End: 2024-03-21
Payer: MEDICARE

## 2024-03-18 DIAGNOSIS — I10 ESSENTIAL HYPERTENSION: Primary | ICD-10-CM

## 2024-03-18 LAB
EKG ATRIAL RATE: 91 BPM
EKG DIAGNOSIS: NORMAL
EKG P AXIS: 31 DEGREES
EKG P-R INTERVAL: 146 MS
EKG Q-T INTERVAL: 340 MS
EKG QRS DURATION: 72 MS
EKG QTC CALCULATION (BAZETT): 418 MS
EKG R AXIS: -3 DEGREES
EKG T AXIS: 67 DEGREES
EKG VENTRICULAR RATE: 91 BPM

## 2024-03-18 PROCEDURE — 93005 ELECTROCARDIOGRAM TRACING: CPT | Performed by: COLON & RECTAL SURGERY

## 2024-03-18 PROCEDURE — 93010 ELECTROCARDIOGRAM REPORT: CPT | Performed by: INTERNAL MEDICINE

## 2024-04-12 ENCOUNTER — HOSPITAL ENCOUNTER (OUTPATIENT)
Facility: HOSPITAL | Age: 69
End: 2024-04-12
Payer: MEDICARE

## 2024-04-12 DIAGNOSIS — I10 ESSENTIAL HYPERTENSION: ICD-10-CM

## 2024-04-12 LAB
ALBUMIN SERPL-MCNC: 4.1 G/DL (ref 3.4–5)
ALBUMIN/GLOB SERPL: 1.4 (ref 0.8–1.7)
ALP SERPL-CCNC: 82 U/L (ref 45–117)
ALT SERPL-CCNC: 22 U/L (ref 13–56)
ANION GAP SERPL CALC-SCNC: 4 MMOL/L (ref 3–18)
AST SERPL-CCNC: 17 U/L (ref 10–38)
BILIRUB SERPL-MCNC: 0.4 MG/DL (ref 0.2–1)
BUN SERPL-MCNC: 20 MG/DL (ref 7–18)
BUN/CREAT SERPL: 14 (ref 12–20)
CALCIUM SERPL-MCNC: 9.6 MG/DL (ref 8.5–10.1)
CHLORIDE SERPL-SCNC: 103 MMOL/L (ref 100–111)
CO2 SERPL-SCNC: 30 MMOL/L (ref 21–32)
CREAT SERPL-MCNC: 1.39 MG/DL (ref 0.6–1.3)
GLOBULIN SER CALC-MCNC: 3 G/DL (ref 2–4)
GLUCOSE SERPL-MCNC: 255 MG/DL (ref 74–99)
POTASSIUM SERPL-SCNC: 4.2 MMOL/L (ref 3.5–5.5)
PROT SERPL-MCNC: 7.1 G/DL (ref 6.4–8.2)
SODIUM SERPL-SCNC: 137 MMOL/L (ref 136–145)

## 2024-04-12 PROCEDURE — 36415 COLL VENOUS BLD VENIPUNCTURE: CPT

## 2024-04-12 PROCEDURE — 80053 COMPREHEN METABOLIC PANEL: CPT

## 2024-04-23 ENCOUNTER — ANESTHESIA EVENT (OUTPATIENT)
Facility: HOSPITAL | Age: 69
End: 2024-04-23
Payer: MEDICARE

## 2024-04-24 ENCOUNTER — ANESTHESIA (OUTPATIENT)
Facility: HOSPITAL | Age: 69
End: 2024-04-24
Payer: MEDICARE

## 2024-04-24 ENCOUNTER — HOSPITAL ENCOUNTER (OUTPATIENT)
Facility: HOSPITAL | Age: 69
Setting detail: OUTPATIENT SURGERY
Discharge: HOME | End: 2024-04-24
Attending: COLON & RECTAL SURGERY | Admitting: COLON & RECTAL SURGERY
Payer: MEDICARE

## 2024-04-24 VITALS
DIASTOLIC BLOOD PRESSURE: 86 MMHG | BODY MASS INDEX: 26.93 KG/M2 | TEMPERATURE: 97.8 F | HEART RATE: 78 BPM | HEIGHT: 63 IN | RESPIRATION RATE: 18 BRPM | OXYGEN SATURATION: 99 % | WEIGHT: 152 LBS | SYSTOLIC BLOOD PRESSURE: 119 MMHG

## 2024-04-24 LAB
GLUCOSE BLD STRIP.AUTO-MCNC: 172 MG/DL (ref 70–110)
GLUCOSE BLD STRIP.AUTO-MCNC: 194 MG/DL (ref 70–110)

## 2024-04-24 PROCEDURE — 3600007502: Performed by: COLON & RECTAL SURGERY

## 2024-04-24 PROCEDURE — 6360000002 HC RX W HCPCS: Performed by: NURSE ANESTHETIST, CERTIFIED REGISTERED

## 2024-04-24 PROCEDURE — 7100000000 HC PACU RECOVERY - FIRST 15 MIN: Performed by: COLON & RECTAL SURGERY

## 2024-04-24 PROCEDURE — 7100000011 HC PHASE II RECOVERY - ADDTL 15 MIN: Performed by: COLON & RECTAL SURGERY

## 2024-04-24 PROCEDURE — 3700000000 HC ANESTHESIA ATTENDED CARE: Performed by: COLON & RECTAL SURGERY

## 2024-04-24 PROCEDURE — 2580000003 HC RX 258: Performed by: NURSE ANESTHETIST, CERTIFIED REGISTERED

## 2024-04-24 PROCEDURE — 88305 TISSUE EXAM BY PATHOLOGIST: CPT

## 2024-04-24 PROCEDURE — 2500000003 HC RX 250 WO HCPCS: Performed by: NURSE ANESTHETIST, CERTIFIED REGISTERED

## 2024-04-24 PROCEDURE — 45380 COLONOSCOPY AND BIOPSY: CPT | Performed by: COLON & RECTAL SURGERY

## 2024-04-24 PROCEDURE — 2709999900 HC NON-CHARGEABLE SUPPLY: Performed by: COLON & RECTAL SURGERY

## 2024-04-24 PROCEDURE — 3600007512: Performed by: COLON & RECTAL SURGERY

## 2024-04-24 PROCEDURE — 3700000001 HC ADD 15 MINUTES (ANESTHESIA): Performed by: COLON & RECTAL SURGERY

## 2024-04-24 PROCEDURE — 7100000010 HC PHASE II RECOVERY - FIRST 15 MIN: Performed by: COLON & RECTAL SURGERY

## 2024-04-24 PROCEDURE — 82962 GLUCOSE BLOOD TEST: CPT

## 2024-04-24 RX ORDER — SODIUM CHLORIDE 9 MG/ML
INJECTION, SOLUTION INTRAVENOUS PRN
Status: DISCONTINUED | OUTPATIENT
Start: 2024-04-24 | End: 2024-04-24 | Stop reason: HOSPADM

## 2024-04-24 RX ORDER — LIDOCAINE HYDROCHLORIDE 20 MG/ML
INJECTION, SOLUTION EPIDURAL; INFILTRATION; INTRACAUDAL; PERINEURAL PRN
Status: DISCONTINUED | OUTPATIENT
Start: 2024-04-24 | End: 2024-04-24 | Stop reason: SDUPTHER

## 2024-04-24 RX ORDER — SODIUM CHLORIDE 0.9 % (FLUSH) 0.9 %
5-40 SYRINGE (ML) INJECTION PRN
Status: DISCONTINUED | OUTPATIENT
Start: 2024-04-24 | End: 2024-04-24 | Stop reason: HOSPADM

## 2024-04-24 RX ORDER — FAMOTIDINE 20 MG/1
20 TABLET, FILM COATED ORAL ONCE
Status: DISCONTINUED | OUTPATIENT
Start: 2024-04-24 | End: 2024-04-24 | Stop reason: HOSPADM

## 2024-04-24 RX ORDER — DEXTROSE MONOHYDRATE 100 MG/ML
INJECTION, SOLUTION INTRAVENOUS CONTINUOUS PRN
Status: DISCONTINUED | OUTPATIENT
Start: 2024-04-24 | End: 2024-04-24 | Stop reason: HOSPADM

## 2024-04-24 RX ORDER — SODIUM CHLORIDE, SODIUM LACTATE, POTASSIUM CHLORIDE, CALCIUM CHLORIDE 600; 310; 30; 20 MG/100ML; MG/100ML; MG/100ML; MG/100ML
INJECTION, SOLUTION INTRAVENOUS CONTINUOUS
Status: DISCONTINUED | OUTPATIENT
Start: 2024-04-24 | End: 2024-04-24 | Stop reason: HOSPADM

## 2024-04-24 RX ORDER — PROPOFOL 10 MG/ML
INJECTION, EMULSION INTRAVENOUS PRN
Status: DISCONTINUED | OUTPATIENT
Start: 2024-04-24 | End: 2024-04-24 | Stop reason: SDUPTHER

## 2024-04-24 RX ORDER — INSULIN LISPRO 100 [IU]/ML
0-12 INJECTION, SOLUTION INTRAVENOUS; SUBCUTANEOUS ONCE
Status: DISCONTINUED | OUTPATIENT
Start: 2024-04-24 | End: 2024-04-24 | Stop reason: HOSPADM

## 2024-04-24 RX ORDER — SODIUM CHLORIDE 0.9 % (FLUSH) 0.9 %
5-40 SYRINGE (ML) INJECTION EVERY 12 HOURS SCHEDULED
Status: DISCONTINUED | OUTPATIENT
Start: 2024-04-24 | End: 2024-04-24 | Stop reason: HOSPADM

## 2024-04-24 RX ADMIN — PROPOFOL 100 MG: 10 INJECTION, EMULSION INTRAVENOUS at 10:11

## 2024-04-24 RX ADMIN — PROPOFOL 50 MG: 10 INJECTION, EMULSION INTRAVENOUS at 10:13

## 2024-04-24 RX ADMIN — PROPOFOL 50 MG: 10 INJECTION, EMULSION INTRAVENOUS at 10:30

## 2024-04-24 RX ADMIN — PROPOFOL 50 MG: 10 INJECTION, EMULSION INTRAVENOUS at 10:19

## 2024-04-24 RX ADMIN — LIDOCAINE HYDROCHLORIDE 100 MG: 20 INJECTION, SOLUTION EPIDURAL; INFILTRATION; INTRACAUDAL; PERINEURAL at 10:11

## 2024-04-24 RX ADMIN — PROPOFOL 50 MG: 10 INJECTION, EMULSION INTRAVENOUS at 10:36

## 2024-04-24 RX ADMIN — PROPOFOL 50 MG: 10 INJECTION, EMULSION INTRAVENOUS at 10:15

## 2024-04-24 RX ADMIN — PROPOFOL 50 MG: 10 INJECTION, EMULSION INTRAVENOUS at 10:33

## 2024-04-24 RX ADMIN — PROPOFOL 50 MG: 10 INJECTION, EMULSION INTRAVENOUS at 10:27

## 2024-04-24 RX ADMIN — PROPOFOL 50 MG: 10 INJECTION, EMULSION INTRAVENOUS at 10:23

## 2024-04-24 RX ADMIN — SODIUM CHLORIDE, POTASSIUM CHLORIDE, SODIUM LACTATE AND CALCIUM CHLORIDE: 600; 310; 30; 20 INJECTION, SOLUTION INTRAVENOUS at 10:02

## 2024-04-24 ASSESSMENT — PAIN - FUNCTIONAL ASSESSMENT: PAIN_FUNCTIONAL_ASSESSMENT: 0-10

## 2024-04-24 ASSESSMENT — PAIN SCALES - GENERAL
PAINLEVEL_OUTOF10: 0

## 2024-04-24 NOTE — PERIOP NOTE
Patient's blood glucose 194 denies treatment of insulin at this time.   
for a responsible adult (18 years or older) to be with you for 24 hours after your surgery.   17. ONE VISITOR will be allowed in the waiting area during your surgery.  Exceptions may be made for surgical admissions, per nursing unit guidelines      Special Instructions:      Bring a list of CURRENT medications.  Follow instructions from the office regarding Blood Thinners and/or Insulin  Follow instructions from the office regarding medications to take the morning of surgery. Amlodipine  Complete bowel prep per MD instructions.     On day of surgery if you are running late, unable to make procedure time, or sick, please call the Pre-op department at [phone]    These surgical instructions were reviewed with Candy during the PAT phone call.

## 2024-04-24 NOTE — ANESTHESIA POSTPROCEDURE EVALUATION
Department of Anesthesiology  Postprocedure Note    Patient: Candy Barrett  MRN: [id number]  YOB: 1955  Date of evaluation: 4/24/2024    Procedure Summary       Date: 04/24/24 Room / Location: [facility] ENDO 03 / [facility] ENDOSCOPY    Anesthesia Start: 1008 Anesthesia Stop: 1048    Procedure: COLONOSCOPY with polypectomy (Abdomen) Diagnosis:       Colon cancer screening      (Colon cancer screening [Z12.11])    Surgeons: Fercho Smallwood MD Responsible Provider: Dulce Rojas MD    Anesthesia Type: MAC ASA Status: 3            Anesthesia Type: No value filed.    Martinez Phase I: Martinez Score: 10    Martinez Phase II: Martinez Score: 10    Anesthesia Post Evaluation    Patient location during evaluation: PACU  Patient participation: complete - patient participated  Level of consciousness: awake and alert  Pain score: 0  Airway patency: patent  Nausea & Vomiting: no nausea and no vomiting  Cardiovascular status: hemodynamically stable  Respiratory status: acceptable  Hydration status: euvolemic  Multimodal analgesia pain management approach  Pain management: adequate    No notable events documented.

## 2024-04-24 NOTE — ANESTHESIA PRE PROCEDURE
Department of Anesthesiology  Preprocedure Note       Name:  Candy Barrett   Age:  68 y.o.  :  1955                                          MRN:  [id number]         Date:  2024      Surgeon: Surgeon(s):  Fercho Smallwood MD    Procedure: Procedure(s):  COLONOSCOPY    Medications prior to admission:   Prior to Admission medications    Medication Sig Start Date End Date Taking? Authorizing Provider   empagliflozin (JARDIANCE) 10 MG tablet Take 1 tablet by mouth daily   Yes Amna Drummond MD   glipiZIDE (GLUCOTROL XL) 2.5 MG extended release tablet Take 1 tablet by mouth daily (with breakfast) 24   Amna Drummond MD   Blood Pressure KIT 1 kit by Does not apply route in the morning and at bedtime 24   Alexander Boateng MD   methocarbamol (ROBAXIN) 500 MG tablet Take 1 tablet by mouth 4 times daily as needed (Pain, spasm) 24   Mary Rizo PA-C   vitamin D (CHOLECALCIFEROL) 125 MCG (5000 UT) CAPS capsule Take 1 capsule by mouth 10/2/23   Amna Drummond MD   ibuprofen (ADVIL;MOTRIN) 800 MG tablet Take 1 tablet by mouth every 6 hours 23   Amna Drummond MD   letrozole (FEMARA) 2.5 MG tablet Take 1 tablet by mouth daily    Amna Drummond MD   amLODIPine (NORVASC) 10 MG tablet Take 1 tablet by mouth daily    Amna Drummond MD   Lancets MISC Use as directed to check blood glucose levels 19   Automatic Reconciliation, Ar   aspirin 81 MG chewable tablet Take 1 tablet by mouth daily    Automatic Reconciliation, Ar   rosuvastatin (CRESTOR) 5 MG tablet Take 1 tablet by mouth 19   Automatic Reconciliation, Ar       Current medications:    Current Facility-Administered Medications   Medication Dose Route Frequency Provider Last Rate Last Admin   • insulin lispro (HUMALOG) injection vial 0-12 Units  0-12 Units SubCUTAneous Once Kala Iyer APRN - CRNA       • glucose chewable tablet 16 g  4 tablet Oral PRN Kala Iyer APRN - CRNA

## 2024-04-24 NOTE — H&P
HPI: Candy Barrett is a 68 y.o. female presenting with chief complain of need for crc screening.    Past Medical History:   Diagnosis Date    Arthritis     Cancer of right breast (HCC) 09/2023    Diabetes (HCC)     Diabetic eye exam (HCC) 10/08/2018    Bilateral cataracts   No retinopathy    Hypertension        Past Surgical History:   Procedure Laterality Date    BREAST BIOPSY Right 09/14/2023    RIGHT BREAST LOCALIZED LUMPECTOMY, RIGHT AXILLARY SENTINEL LYMPH NODE BIOPSY performed by Jada Alves DO at [facility] MAIN OR    BREAST LUMPECTOMY  09/14/2023    HYSTERECTOMY (CERVIX STATUS UNKNOWN)  1980    TONSILLECTOMY      as a child    US BREAST BIOPSY W LOC DEVICE 1ST LESION RIGHT Right 08/14/2023    US BREAST BIOPSY W LOC DEVICE 1ST LESION RIGHT 8/14/2023 [facility] US       Family History   Problem Relation Age of Onset    Hypertension Brother     Cancer Maternal Grandmother     Breast Cancer Mother     Diabetes Mother     Hypertension Father     Thyroid Cancer Sister        Social History     Socioeconomic History    Marital status: Single     Spouse name: None    Number of children: None    Years of education: 14    Highest education level: None   Tobacco Use    Smoking status: Never    Smokeless tobacco: Never   Vaping Use    Vaping Use: Never used   Substance and Sexual Activity    Alcohol use: Not Currently    Drug use: Never   Social History Narrative    Patient lives alone, no pets.       Current Outpatient Medications   Medication Instructions    amLODIPine (NORVASC) 10 mg, Oral, DAILY    aspirin 81 mg, Oral, DAILY    Blood Pressure KIT 1 kit, Does not apply, 2 times daily    empagliflozin (JARDIANCE) 10 mg, Oral, DAILY    glipiZIDE (GLUCOTROL XL) 2.5 mg, Oral, DAILY WITH BREAKFAST    ibuprofen (ADVIL;MOTRIN) 800 mg, Oral, EVERY 6 HOURS    Lancets MISC Use as directed to check blood glucose levels    letrozole (FEMARA) 2.5 mg, Oral, DAILY    methocarbamol (ROBAXIN) 500 mg, Oral, 4 TIMES DAILY PRN

## 2024-04-24 NOTE — OP NOTE
Colonoscopy Procedure Note      Candy Barrett  1955  [id number]                Date of Procedure: 04/24/24    Preoperative diagnosis: screen    Postoperative diagnosis: distal rectal polyp    :  Fercho Smallwood MD    Assistant(s): Circulator: Genie Kendall, RN  Endoscopy Technician: Naya Langston    Sedation: MAC    Complications: None    Implants: None    Procedure Details:  Prior to the procedure, a history and physical were performed. The patient’s medications, allergies and sensitivities were reviewed and all questions were answered.  After informed consent was obtained for the procedure, with all risks and benefits of procedure explained. The patient was taken to the endoscopy suite and placed in the left lateral decubitus position.  Patient identification and proposed procedure were verified prior to the procedure by the nurse and I. After sequential anesthesia administered by anesthesiologist, a digital rectal exam was performed and was normal.  The Olympus video colonoscope was introduced through the anus and advanced to cecum, which was identified by the ileocecal valve and appendiceal orifice.   The colonoscope was slowly withdrawn and the mucosa examined for any abnormalities.  Cecal withdrawal time was greater than 6 minutes. The patient tolerated the procedure well. There were no complications.    The quality of preparation was excellent.     Findings/Interventions:   Polyps - #1, 5 mm in size, located in the rectum, removed by cold biopsy and sent for pathology.    Remaining tissue yellow, possibly lipoma. R anterior quadrant very distal rectum just proximal to hemorrhoids. Amenable to EUA excision if necessary trans-anal.     EBL: none    Recommendations: -Repeat colonoscopy in 10 years   NO aspirin for 5 days     Discharge Disposition:  Home  Fercho Smallwood MD  4/24/2024  10:43 AM

## 2024-04-24 NOTE — DISCHARGE INSTRUCTIONS
grains.  Do not smoke. If you need help quitting, talk to your doctor about stop-smoking programs and medicines. These can increase your chances of quitting for good.  If you drink alcohol, limit how much you drink. Limit alcohol to 2 drinks a day for men and 1 drink a day for women.  When should you call for help?   Call your doctor now or seek immediate medical care if:    You have severe belly pain.     Your stools are maroon or very bloody.   Watch closely for changes in your health, and be sure to contact your doctor if:    You have a fever.     You have nausea or vomiting.     You have a change in bowel habits (new constipation or diarrhea).     Your symptoms get worse or are not improving as expected.   Where can you learn more?  Go to https://www.[employer].net/patientEd and enter C571 to learn more about \"Colon Polyps: Care Instructions.\"  Current as of: October 19, 2023               Content Version: 14.0  © 2006-2024 [employer]   Care instructions adapted under license by [employer]. If you have questions about a medical condition or this instruction, always ask your healthcare professional. [employer] disclaims any warranty or liability for your use of this information.         Colonoscopy: What to Expect at Home  Your Recovery  After a colonoscopy, you'll stay at the clinic until you wake up. Then you can go home. But you'll need to arrange for a ride. Your doctor will tell you when you can eat and do your other usual activities.  Your doctor will talk to you about when you'll need your next colonoscopy. Your doctor can help you decide how often you need to be checked. This will depend on the results of your test and your risk for colorectal cancer.  After the test, you may be bloated or have gas pains. You may need to pass gas. If a biopsy was done or a polyp was removed, you may have streaks of blood in your stool (feces) for a few days. Problems such as heavy rectal

## 2024-04-29 NOTE — PROGRESS NOTES
302 [facility]  Breast & Colorectal Oncology Nurse Navigator Encounter    Name: Nate Scott  Age: 75 y.o.  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 Ms. Kelli Elias is diagnosed with Right Breast Infiltrating Ductal Adenocarcinoma ER/[place]+/HER2-  Dr. Prem Jasso reviewed results with patient recommend surgical consultation. I provided emotional support, a patient guide with printed copy of pathology results, and additional breast cancer education. Patient states mother was diagnosed with breast cancer in early 30-40s. Surgical consult scheduled with Dr. Ruddy Watkins on 2023 at 8am. Patient encouraged to contact me with any questions or concerns. Interdisciplinary Team:  Surgeon:Dr. Didier Ponce   Appointment date:2023 at [address] [facility]  Location: [address] [place] [address] [phone].     Nurse Navigator: Renée Kaplan, RN      SHAMEKA NeriN, RN  Breast & Colorectal Cancer Nurse Navigator    [address] [facility]  Office number [phone]  [email or web address]  Good Help to Those in [facility] SPECIALTY [facility] Low Risk

## 2024-06-12 ENCOUNTER — HOSPITAL ENCOUNTER (OUTPATIENT)
Facility: HOSPITAL | Age: 69
Discharge: HOME | End: 2024-06-15
Attending: RADIOLOGY
Payer: MEDICARE

## 2024-06-12 DIAGNOSIS — C50.211 MALIGNANT NEOPLASM OF UPPER-INNER QUADRANT OF RIGHT FEMALE BREAST, UNSPECIFIED ESTROGEN RECEPTOR STATUS (HCC): ICD-10-CM

## 2024-06-12 PROCEDURE — G0279 TOMOSYNTHESIS, MAMMO: HCPCS

## 2024-07-16 NOTE — PROGRESS NOTES
Candy Barrett is a 68 y.o. female (: 1955)     Chief Complaint   Patient presents with    Follow-up     Right breast        Medication list and allergies have been reviewed with Candy Barrett and updated as of today's date.     I have gone over all Medical, Surgical and Social History with Candy Barrett and updated/added the information accordingly.      1. Have you been to the ER, urgent care clinic since your last visit?  Hospitalized since your last visit?No    2. Have you seen or consulted any other health care providers outside of the [facility] since your last visit?  Include any pap smears or colon screening. No

## 2024-07-17 ENCOUNTER — OFFICE VISIT (OUTPATIENT)
Age: 69
End: 2024-07-17
Payer: MEDICARE

## 2024-07-17 VITALS
BODY MASS INDEX: 26.97 KG/M2 | HEIGHT: 63 IN | HEART RATE: 99 BPM | OXYGEN SATURATION: 95 % | WEIGHT: 152.2 LBS | RESPIRATION RATE: 14 BRPM | SYSTOLIC BLOOD PRESSURE: 138 MMHG | DIASTOLIC BLOOD PRESSURE: 78 MMHG | TEMPERATURE: 96.9 F

## 2024-07-17 DIAGNOSIS — C50.911 MALIGNANT NEOPLASM OF RIGHT BREAST IN FEMALE, ESTROGEN RECEPTOR POSITIVE, UNSPECIFIED SITE OF BREAST (HCC): Primary | ICD-10-CM

## 2024-07-17 DIAGNOSIS — Z80.3 FAMILY HISTORY OF BREAST CANCER IN MOTHER: ICD-10-CM

## 2024-07-17 DIAGNOSIS — Z17.0 MALIGNANT NEOPLASM OF RIGHT BREAST IN FEMALE, ESTROGEN RECEPTOR POSITIVE, UNSPECIFIED SITE OF BREAST (HCC): Primary | ICD-10-CM

## 2024-07-17 PROCEDURE — 3078F DIAST BP <80 MM HG: CPT | Performed by: SURGERY

## 2024-07-17 PROCEDURE — 3075F SYST BP GE 130 - 139MM HG: CPT | Performed by: SURGERY

## 2024-07-17 PROCEDURE — 1123F ACP DISCUSS/DSCN MKR DOCD: CPT | Performed by: SURGERY

## 2024-07-17 PROCEDURE — 99214 OFFICE O/P EST MOD 30 MIN: CPT | Performed by: SURGERY

## 2024-07-20 ASSESSMENT — ENCOUNTER SYMPTOMS
CHEST TIGHTNESS: 0
SHORTNESS OF BREATH: 0

## 2024-07-25 ENCOUNTER — HOSPITAL ENCOUNTER (OUTPATIENT)
Facility: HOSPITAL | Age: 69
Setting detail: RECURRING SERIES
Discharge: HOME | End: 2024-07-28
Attending: RADIOLOGY
Payer: MEDICARE

## 2024-07-25 PROCEDURE — 99213 OFFICE O/P EST LOW 20 MIN: CPT

## 2024-07-25 PROCEDURE — 99212 OFFICE O/P EST SF 10 MIN: CPT | Performed by: RADIOLOGY

## 2024-08-26 ASSESSMENT — ENCOUNTER SYMPTOMS
SHORTNESS OF BREATH: 0
CONSTIPATION: 0
COUGH: 0
NAUSEA: 0
WHEEZING: 0
ABDOMINAL PAIN: 0
COLOR CHANGE: 0
CHEST TIGHTNESS: 0
BLOOD IN STOOL: 0
APNEA: 0
DIARRHEA: 0

## 2024-08-26 NOTE — PROGRESS NOTES
for decreased urine volume and difficulty urinating.   Musculoskeletal:  Negative for gait problem, myalgias and neck pain.   Skin:  Negative for color change, pallor and rash.   Neurological:  Negative for dizziness, syncope, facial asymmetry, speech difficulty, weakness, light-headedness and numbness.   Psychiatric/Behavioral:  Negative for confusion and sleep disturbance.          Physical Exam  Vitals reviewed.   Constitutional:       General: She is not in acute distress.     Appearance: Normal appearance. She is not ill-appearing or diaphoretic.   HENT:      Head: Normocephalic and atraumatic.   Eyes:      General: No scleral icterus.        Right eye: No discharge.         Left eye: No discharge.      Conjunctiva/sclera: Conjunctivae normal.   Neck:      Vascular: No carotid bruit.   Cardiovascular:      Rate and Rhythm: Normal rate and regular rhythm.      Heart sounds: Normal heart sounds. No murmur heard.     No friction rub. No gallop.   Pulmonary:      Effort: Pulmonary effort is normal. No respiratory distress.      Breath sounds: Normal breath sounds. No wheezing, rhonchi or rales.   Chest:      Chest wall: No tenderness.   Abdominal:      General: Bowel sounds are normal. There is no distension.      Palpations: Abdomen is soft.      Tenderness: There is no abdominal tenderness. There is no guarding.   Musculoskeletal:         General: No swelling or tenderness.      Cervical back: Neck supple.      Right lower leg: Edema present.      Left lower leg: Edema present.   Skin:     General: Skin is warm and dry.      Findings: No erythema or rash.   Neurological:      Mental Status: She is alert. Mental status is at baseline.      Motor: No weakness.      Gait: Gait normal.   Psychiatric:         Mood and Affect: Mood normal.         Behavior: Behavior normal.         Thought Content: Thought content normal.         ASSESSMENT and PLAN  Ms. Barrett has a reminder for a \"due or due soon\" health maintenance.  I have asked that she contact her primary care provider for follow-up on this health maintenance.    Coronary risk equivalent DM - Aspirin 81mg po daily, Pause crestor for now, had some cramping in the legs.      Bilateral lower extremity edema - Check D Dimer evaluate for DVT, Check BNP evaluate fore heart failure.  Currently not in system for review.      HTN - Normotensive systolic diastolic, Norvasc 10mg po daily

## 2024-08-30 ENCOUNTER — OFFICE VISIT (OUTPATIENT)
Age: 69
End: 2024-08-30
Payer: MEDICARE

## 2024-08-30 ENCOUNTER — HOSPITAL ENCOUNTER (OUTPATIENT)
Facility: HOSPITAL | Age: 69
Discharge: HOME | End: 2024-09-02

## 2024-08-30 VITALS
HEIGHT: 63 IN | DIASTOLIC BLOOD PRESSURE: 59 MMHG | BODY MASS INDEX: 26.93 KG/M2 | SYSTOLIC BLOOD PRESSURE: 119 MMHG | HEART RATE: 90 BPM | WEIGHT: 152 LBS | OXYGEN SATURATION: 98 %

## 2024-08-30 DIAGNOSIS — I10 HYPERTENSION, UNSPECIFIED TYPE: ICD-10-CM

## 2024-08-30 DIAGNOSIS — R60.9 EDEMA, UNSPECIFIED TYPE: Primary | ICD-10-CM

## 2024-08-30 LAB — LABCORP SPECIMEN COLLECTION: NORMAL

## 2024-08-30 PROCEDURE — 1123F ACP DISCUSS/DSCN MKR DOCD: CPT | Performed by: INTERNAL MEDICINE

## 2024-08-30 PROCEDURE — 99001 SPECIMEN HANDLING PT-LAB: CPT

## 2024-08-30 PROCEDURE — 3074F SYST BP LT 130 MM HG: CPT | Performed by: INTERNAL MEDICINE

## 2024-08-30 PROCEDURE — 3078F DIAST BP <80 MM HG: CPT | Performed by: INTERNAL MEDICINE

## 2024-08-30 PROCEDURE — 99214 OFFICE O/P EST MOD 30 MIN: CPT | Performed by: INTERNAL MEDICINE

## 2024-08-30 NOTE — PATIENT INSTRUCTIONS
Learning About the Mediterranean Diet  What is the Mediterranean diet?     The Mediterranean diet is a style of eating rather than a diet plan. It features foods eaten in [place], [place], southern [place] and [place], and other countries along the [place] Sea. It emphasizes eating foods like fish, fruits, vegetables, beans, high-fiber breads and whole grains, nuts, and olive oil. This style of eating includes limited red meat, cheese, and sweets.  Why choose the Mediterranean diet?  A Mediterranean-style diet may improve heart health. It contains more fat than other heart-healthy diets. But the fats are mainly from nuts, unsaturated oils (such as fish oils and olive oil), and certain nut or seed oils (such as canola, soybean, or flaxseed oil). These fats may help protect the heart and blood vessels.  How can you get started on the Mediterranean diet?  Here are some things you can do to switch to a more Mediterranean way of eating.  What to eat  Eat a variety of fruits and vegetables each day, such as grapes, blueberries, tomatoes, broccoli, peppers, figs, olives, spinach, eggplant, beans, lentils, and chickpeas.  Eat a variety of whole-grain foods each day, such as oats, brown rice, and whole wheat bread, pasta, and couscous.  Eat fish at least 2 times a week. Try tuna, salmon, mackerel, lake trout, herring, or sardines.  Eat moderate amounts of low-fat dairy products, such as milk, cheese, or yogurt.  Eat moderate amounts of poultry and eggs.  Choose healthy (unsaturated) fats, such as nuts, olive oil, and certain nut or seed oils like canola, soybean, and flaxseed.  Limit unhealthy (saturated) fats, such as butter, palm oil, and coconut oil. And limit fats found in animal products, such as meat and dairy products made with whole milk. Try to eat red meat only a few times a month in very small amounts.  Limit sweets and desserts to only a few times a week. This includes sugar-sweetened drinks like soda.  The  Mediterranean diet may also include red wine with your meal--1 glass each day for women and up to 2 glasses a day for men.  Tips for eating at home  Use herbs, spices, garlic, lemon zest, and citrus juice instead of salt to add flavor to foods.  Add avocado slices to your sandwich instead of butterfield.  Have fish for lunch or dinner instead of red meat. Brush the fish with olive oil, and broil or grill it.  Sprinkle your salad with seeds or nuts instead of cheese.  Cook with olive or canola oil instead of butter or oils that are high in saturated fat.  Switch from 2% milk or whole milk to 1% or fat-free milk.  Dip raw vegetables in a vinaigrette dressing or hummus instead of dips made from mayonnaise or sour cream.  Have a piece of fruit for dessert instead of a piece of cake. Try baked apples, or have some dried fruit.  Tips for eating out  Try broiled, grilled, baked, or poached fish instead of having it fried or breaded.  Ask your  to have your meals prepared with olive oil instead of butter.  Order dishes made with marinara sauce or sauces made from olive oil. Avoid sauces made from cream or mayonnaise.  Choose whole-grain breads, whole wheat pasta and pizza crust, brown rice, beans, and lentils.  Cut back on butter or margarine on bread. Instead, you can dip your bread in a small amount of olive oil.  Ask for a side salad or grilled vegetables instead of french fries or chips.  Where can you learn more?  Go to https://www.[employer].net/patientEd and enter O407 to learn more about \"Learning About the Mediterranean Diet.\"  Current as of: May 9, 2022               Content Version: 13.5  © 5435-8813 [employer].   Care instructions adapted under license by [employer]. If you have questions about a medical condition or this instruction, always ask your healthcare professional. [employer] disclaims any warranty or liability for your use of this information.

## 2024-09-05 LAB — SPECIMEN STATUS REPORT: NORMAL

## 2024-10-17 ENCOUNTER — TELEPHONE (OUTPATIENT)
Age: 69
End: 2024-10-17

## 2024-10-17 NOTE — TELEPHONE ENCOUNTER
Ms. Barrett left me a voicemail wanting to know if she can still get the bra's? She said she had an order last year. Please call her at [phone].

## 2024-10-17 NOTE — TELEPHONE ENCOUNTER
Lvm letting pt know she should be able to contact [employer] to make an appt without a new order.

## 2025-01-29 ENCOUNTER — OFFICE VISIT (OUTPATIENT)
Age: 70
End: 2025-01-29
Payer: MEDICARE

## 2025-01-29 ENCOUNTER — CLINICAL DOCUMENTATION (OUTPATIENT)
Age: 70
End: 2025-01-29

## 2025-01-29 VITALS
WEIGHT: 154 LBS | SYSTOLIC BLOOD PRESSURE: 136 MMHG | HEART RATE: 93 BPM | BODY MASS INDEX: 27.29 KG/M2 | RESPIRATION RATE: 16 BRPM | DIASTOLIC BLOOD PRESSURE: 70 MMHG | TEMPERATURE: 97.3 F | OXYGEN SATURATION: 97 % | HEIGHT: 63 IN

## 2025-01-29 DIAGNOSIS — Z17.0 MALIGNANT NEOPLASM OF RIGHT BREAST IN FEMALE, ESTROGEN RECEPTOR POSITIVE, UNSPECIFIED SITE OF BREAST (HCC): Primary | ICD-10-CM

## 2025-01-29 DIAGNOSIS — Z80.3 FAMILY HISTORY OF BREAST CANCER IN MOTHER: ICD-10-CM

## 2025-01-29 DIAGNOSIS — C50.911 MALIGNANT NEOPLASM OF RIGHT BREAST IN FEMALE, ESTROGEN RECEPTOR POSITIVE, UNSPECIFIED SITE OF BREAST (HCC): Primary | ICD-10-CM

## 2025-01-29 DIAGNOSIS — I89.0 LYMPHEDEMA OF BREAST: ICD-10-CM

## 2025-01-29 PROCEDURE — 1123F ACP DISCUSS/DSCN MKR DOCD: CPT | Performed by: SURGERY

## 2025-01-29 PROCEDURE — 99214 OFFICE O/P EST MOD 30 MIN: CPT | Performed by: SURGERY

## 2025-01-29 PROCEDURE — 3075F SYST BP GE 130 - 139MM HG: CPT | Performed by: SURGERY

## 2025-01-29 PROCEDURE — 3078F DIAST BP <80 MM HG: CPT | Performed by: SURGERY

## 2025-01-29 ASSESSMENT — ENCOUNTER SYMPTOMS
SHORTNESS OF BREATH: 0
CHEST TIGHTNESS: 0

## 2025-01-29 NOTE — PROGRESS NOTES
Candy Barrett is a 69 y.o. female (: 1955)     Chief Complaint   Patient presents with    Follow-up     Right breast        Medication list and allergies have been reviewed with Candy Barrett and updated as of today's date.     I have gone over all Medical, Surgical and Social History with Candy Barrett and updated/added the information accordingly.     1. Have you been to the ER, urgent care clinic since your last visit?  Hospitalized since your last visit?No    2. Have you seen or consulted any other health care providers outside of the [facility] since your last visit?  Include any pap smears or colon screening. No    
granulocytes (promyelocytes + myelocytes + metamyelocytes), their presence  indicates a left shift. An IG > 3% may predict positive blood cultures with 98% specificity.  (P<0.04) and 92% Positive Predictive Value ([place])1. Increased immature granulocytes  assist with the detection of infection and/or inflammation and may be present at an early stage  and are more sensitive and specific than band counts.        Neutrophils Segmented 09/24/2024 82.4 (H)  34 - 64 % Final    Lymphocytes 09/24/2024 11.2 (L)  28 - 48 % Final    Monocytes 09/24/2024 5.3  1 - 13 % Final    Eosinophils 09/24/2024 0.5  0 - 5 % Final    Basophils 09/24/2024 0.1  0 - 3 % Final    Potassium 09/24/2024 3.9  3.5 - 5.1 mEq/L Final    Chloride 09/24/2024 104  98 - 107 mEq/L Final    Sodium 09/24/2024 139  136 - 145 mEq/L Final    CO2 09/24/2024 26  20 - 31 mEq/L Final    Glucose 09/24/2024 167 (H)  74 - 106 mg/dl Final    BUN 09/24/2024 17  9 - 23 mg/dl Final    Creatinine 09/24/2024 1.35 (H)  0.55 - 1.02 mg/dl Final    GFR  09/24/2024 50.0    Final    Comment: THE [facility] STATES THAT THE MDRD STUDY EQUATION SHOULD ONLY BE USED ON  INDIVIDUALS 18 OR OLDER. THE REPORT ALSO NOTES THAT THE MDRD STUDY EQUATION HAS NOT BEEN  VALIDATED FOR USE WITH THE ELDERLY (OVER 70 YEARS OF AGE), PREGNANT WOMEN, PATIENTS WITH SERIOUS  COMORBID CONDITIONS, OR PERSONS WITH EXTREMES OF BODY SIZE, MUSCLE MASS, OR NUTRITIONAL STATUS.  APPLICATION OF THE EQUATION TO THESE PATIENT GROUPS MAY LEAD TO ERRORS IN GFR ESTIMATION. GFR  ESTIMATING EQUATIONS HAVE POORER AGREEMENT WITH MEASURED GFR FOR ILL HOSPITALIZED PATIENTS AND  FOR PEOPLE WITH NEAR NORMAL KIDNEY FUNCTION THAN FOR SUBJECTS IN THE MDRD STUDY. VALIDATION  STUDIES ARE IN PROGRESS TO EVALUATE THE MDRD STUDY EQUATION FOR ADDITIONAL ETHNIC GROUPS, THE  ELDERLY, VARIOUS DISEASE CONDITIONS, AND PEOPLE WITH NORMAL KIDNEY FUNCTION.    [facility]----REFERS TO

## 2025-01-29 NOTE — PROGRESS NOTES
[facility]  Breast & Colorectal Oncology Nurse Navigator Encounter    Name: Candy Barrett  Age: 69 y.o.  : 1955      Encounter type:  []Initial Navigator Encounter  []Patient Initiated  [x]Navigator Follow-up  []Other:     Narrative:   Patient arrived for 6 month follow up with Dr. Alves. Plan of care reviewed. Mammogram ordered for 2025. She's to continue monthly self breast exams and notify of any changes. Continue medical and radiation oncology appointments and return in 6 months.     Current plan of care discussed,   Reminded that I am available as needed for any needs/issues/questions that arise and encouraged to call; My contact info provided. All questions answered.  Will follow up with Candy Barrett  to discuss needs and plan of care.    Interdisciplinary Team:  Surgeon: Dr. Alves  Surgery/Procedure: Right Lumpectomy, right axillary sentinel lymph node biopsy  Surgery/Procedure Completed Date: 23  Post op: 23  3mth: 24  6mth: 24  Next 6mth Appointment Date:25    Med-Onc:Dr. Espitia  Last Appointment Date:24  Chemotherapy Regimen:Letrozole pill  Chemotherapy Start Date:24  Chemotherapy Completion Date: 5 years    Rad-Onc:Dr. Mcclellan  Last Appointment Date:23  Radiation Start Date: 10/24/23  Radiation Completion Date: 23    Labs: Genetics- Eron- Negative  Date Completed: 23    Nurse Navigator: HEMA Gore BSN, RN  Breast & Colorectal Cancer Nurse Navigator    [facility]  [address] [place] [address]  Office number [phone]  Cell number [phone]  [email or web address]  Good Help to Those in Need®

## 2025-02-27 ENCOUNTER — HOSPITAL ENCOUNTER (OUTPATIENT)
Facility: HOSPITAL | Age: 70
Setting detail: RECURRING SERIES
End: 2025-02-27
Attending: RADIOLOGY
Payer: MEDICARE

## 2025-02-27 PROCEDURE — 99213 OFFICE O/P EST LOW 20 MIN: CPT

## 2025-02-27 PROCEDURE — 99212 OFFICE O/P EST SF 10 MIN: CPT | Performed by: RADIOLOGY

## 2025-02-28 ENCOUNTER — OFFICE VISIT (OUTPATIENT)
Age: 70
End: 2025-02-28
Payer: MEDICARE

## 2025-02-28 ENCOUNTER — HOSPITAL ENCOUNTER (OUTPATIENT)
Facility: HOSPITAL | Age: 70
Setting detail: SPECIMEN
Discharge: HOME | End: 2025-03-03

## 2025-02-28 VITALS
WEIGHT: 158 LBS | HEART RATE: 89 BPM | SYSTOLIC BLOOD PRESSURE: 127 MMHG | HEIGHT: 63 IN | BODY MASS INDEX: 28 KG/M2 | OXYGEN SATURATION: 96 % | DIASTOLIC BLOOD PRESSURE: 59 MMHG

## 2025-02-28 DIAGNOSIS — I10 HYPERTENSION, UNSPECIFIED TYPE: ICD-10-CM

## 2025-02-28 DIAGNOSIS — R60.0 LOCALIZED EDEMA: Primary | ICD-10-CM

## 2025-02-28 LAB — LABCORP SPECIMEN COLLECTION: NORMAL

## 2025-02-28 PROCEDURE — 3074F SYST BP LT 130 MM HG: CPT | Performed by: INTERNAL MEDICINE

## 2025-02-28 PROCEDURE — 99214 OFFICE O/P EST MOD 30 MIN: CPT | Performed by: INTERNAL MEDICINE

## 2025-02-28 PROCEDURE — 99001 SPECIMEN HANDLING PT-LAB: CPT

## 2025-02-28 PROCEDURE — 3078F DIAST BP <80 MM HG: CPT | Performed by: INTERNAL MEDICINE

## 2025-02-28 PROCEDURE — 1123F ACP DISCUSS/DSCN MKR DOCD: CPT | Performed by: INTERNAL MEDICINE

## 2025-02-28 RX ORDER — LOSARTAN POTASSIUM 25 MG/1
25 TABLET ORAL DAILY
Qty: 90 TABLET | Refills: 1 | Status: SHIPPED | OUTPATIENT
Start: 2025-02-28

## 2025-02-28 RX ORDER — PRAVASTATIN SODIUM 20 MG
20 TABLET ORAL DAILY
Qty: 90 TABLET | Refills: 1 | Status: SHIPPED | OUTPATIENT
Start: 2025-02-28

## 2025-02-28 NOTE — PROGRESS NOTES
Have you had Fatigue?  No    2.   Have you had have you had Chest Pain? No     3.   Have you had Dyspnea (SOB) ? No   4.   Have you had Orthopnea? No     5.   Have you had PND? No   6.   Have you had leg swelling? Yes   7.    Have you had any weight gain? No     8. Have you had any palpitations? No      9. Have you had any syncope? No   10. Do you have any wounds on legs?no  
Hydroxyzine Pamoate Rash    Lisinopril Rash    Loratadine Rash       Prior to Admission medications    Medication Sig Start Date End Date Taking? Authorizing Provider   cephALEXin (KEFLEX) 500 MG capsule Take 1 capsule by mouth 4 times daily 9/24/24   Regina Tyson PA-C   empagliflozin (JARDIANCE) 10 MG tablet Take 1 tablet by mouth daily    Amna Drummond MD   glipiZIDE (GLUCOTROL XL) 2.5 MG extended release tablet Take 2 tablets by mouth daily (with breakfast) 1/4/24   Amna Drummond MD   Blood Pressure KIT 1 kit by Does not apply route in the morning and at bedtime 2/28/24   Alexander Boateng MD   vitamin D (CHOLECALCIFEROL) 125 MCG (5000 UT) CAPS capsule Take 1 capsule by mouth 10/2/23   Amna Drummond MD   ibuprofen (ADVIL;MOTRIN) 800 MG tablet Take 1 tablet by mouth every 6 hours 11/16/23   mAna Drummond MD   letrozole (FEMARA) 2.5 MG tablet Take 1 tablet by mouth daily    Amna Drummond MD   amLODIPine (NORVASC) 10 MG tablet Take 1 tablet by mouth daily    Amna Drummond MD   Lancets MISC Use as directed to check blood glucose levels 5/22/19   Automatic Reconciliation, Ar   aspirin 81 MG chewable tablet Take 1 tablet by mouth daily    Automatic Reconciliation, Ar   rosuvastatin (CRESTOR) 5 MG tablet Take 1 tablet by mouth 5/22/19   Automatic Reconciliation, Ar       No results found for: \"LIPIDPAN\", \"BMP\", \"CMP\"     There were no vitals taken for this visit.    HPI    Review of Systems   Constitutional:  Negative for activity change, appetite change, diaphoresis, fatigue and unexpected weight change.   Eyes:  Negative for visual disturbance.   Respiratory:  Negative for apnea, cough, chest tightness, shortness of breath and wheezing.    Cardiovascular:  Positive for leg swelling. Negative for chest pain and palpitations.   Gastrointestinal:  Negative for abdominal pain, blood in stool, constipation, diarrhea and nausea.   Endocrine: Negative for cold

## 2025-03-05 ENCOUNTER — HOSPITAL ENCOUNTER (OUTPATIENT)
Facility: HOSPITAL | Age: 70
Setting detail: SPECIMEN
Discharge: HOME | End: 2025-03-08

## 2025-03-05 LAB — LABCORP SPECIMEN COLLECTION: NORMAL

## 2025-03-05 PROCEDURE — 99001 SPECIMEN HANDLING PT-LAB: CPT

## 2025-03-06 LAB
BUN SERPL-MCNC: 16 MG/DL (ref 8–27)
BUN/CREAT SERPL: 19 (ref 12–28)
CHLORIDE SERPL-SCNC: 103 MMOL/L (ref 96–106)
CO2 SERPL-SCNC: 25 MMOL/L (ref 20–29)
CREAT SERPL-MCNC: 0.86 MG/DL (ref 0.57–1)
EGFRCR SERPLBLD CKD-EPI 2021: 73 ML/MIN/1.73
GLUCOSE SERPL-MCNC: 84 MG/DL (ref 70–99)
POTASSIUM SERPL-SCNC: 4.1 MMOL/L (ref 3.5–5.2)
SODIUM SERPL-SCNC: 142 MMOL/L (ref 134–144)
SPECIMEN STATUS REPORT: NORMAL

## 2025-03-07 ENCOUNTER — HOSPITAL ENCOUNTER (OUTPATIENT)
Facility: HOSPITAL | Age: 70
Setting detail: SPECIMEN
Discharge: HOME | End: 2025-03-10

## 2025-03-07 LAB — LABCORP SPECIMEN COLLECTION: NORMAL

## 2025-03-07 PROCEDURE — 99001 SPECIMEN HANDLING PT-LAB: CPT

## 2025-03-08 LAB
BUN SERPL-MCNC: 13 MG/DL (ref 8–27)
BUN/CREAT SERPL: 15 (ref 12–28)
CALCIUM SERPL-MCNC: 9.4 MG/DL (ref 8.7–10.3)
CHLORIDE SERPL-SCNC: 103 MMOL/L (ref 96–106)
CO2 SERPL-SCNC: 23 MMOL/L (ref 20–29)
CREAT SERPL-MCNC: 0.84 MG/DL (ref 0.57–1)
EGFRCR SERPLBLD CKD-EPI 2021: 75 ML/MIN/1.73
GLUCOSE SERPL-MCNC: 77 MG/DL (ref 70–99)
POTASSIUM SERPL-SCNC: 4.3 MMOL/L (ref 3.5–5.2)
SODIUM SERPL-SCNC: 140 MMOL/L (ref 134–144)
SPECIMEN STATUS REPORT: NORMAL

## 2025-07-02 ENCOUNTER — HOSPITAL ENCOUNTER (OUTPATIENT)
Facility: HOSPITAL | Age: 70
Discharge: HOME | End: 2025-07-05
Attending: SURGERY
Payer: MEDICARE

## 2025-07-02 VITALS — BODY MASS INDEX: 27.29 KG/M2 | WEIGHT: 154 LBS | HEIGHT: 63 IN

## 2025-07-02 DIAGNOSIS — Z12.31 VISIT FOR SCREENING MAMMOGRAM: ICD-10-CM

## 2025-07-02 PROCEDURE — 77063 BREAST TOMOSYNTHESIS BI: CPT

## 2025-07-08 ENCOUNTER — OFFICE VISIT (OUTPATIENT)
Age: 70
End: 2025-07-08
Payer: MEDICARE

## 2025-07-08 VITALS
SYSTOLIC BLOOD PRESSURE: 136 MMHG | BODY MASS INDEX: 27.25 KG/M2 | RESPIRATION RATE: 20 BRPM | OXYGEN SATURATION: 98 % | WEIGHT: 153.8 LBS | DIASTOLIC BLOOD PRESSURE: 70 MMHG | HEART RATE: 109 BPM | HEIGHT: 63 IN | TEMPERATURE: 97.3 F

## 2025-07-08 DIAGNOSIS — Z80.3 FAMILY HISTORY OF BREAST CANCER IN MOTHER: ICD-10-CM

## 2025-07-08 DIAGNOSIS — C50.911 MALIGNANT NEOPLASM OF RIGHT BREAST IN FEMALE, ESTROGEN RECEPTOR POSITIVE, UNSPECIFIED SITE OF BREAST (HCC): Primary | ICD-10-CM

## 2025-07-08 DIAGNOSIS — Z17.0 MALIGNANT NEOPLASM OF RIGHT BREAST IN FEMALE, ESTROGEN RECEPTOR POSITIVE, UNSPECIFIED SITE OF BREAST (HCC): Primary | ICD-10-CM

## 2025-07-08 DIAGNOSIS — I89.0 LYMPHEDEMA OF BREAST: ICD-10-CM

## 2025-07-08 PROCEDURE — 99214 OFFICE O/P EST MOD 30 MIN: CPT

## 2025-07-08 PROCEDURE — 3075F SYST BP GE 130 - 139MM HG: CPT

## 2025-07-08 PROCEDURE — 1123F ACP DISCUSS/DSCN MKR DOCD: CPT

## 2025-07-08 PROCEDURE — 3078F DIAST BP <80 MM HG: CPT

## 2025-07-08 ASSESSMENT — ENCOUNTER SYMPTOMS
SHORTNESS OF BREATH: 0
NAUSEA: 0
ABDOMINAL PAIN: 0
COLOR CHANGE: 0
VOMITING: 0

## 2025-07-08 NOTE — PROGRESS NOTES
Candy Barrett is a 69 y.o. female (: 1955)    Chief Complaint   Patient presents with    Follow-up     Right breast        Medication list and allergies have been reviewed with Cnady Barrett and updated as of today's date.     I have gone over all Medical, Surgical and Social History with Candy Barrett and up...    1. Have you been to the ER, urgent care clinic since your last visit?  Hospitalized since your last visit?Yes [facility]     2. Have you seen or consulted any other health care providers outside of the [facility] since your last visit?  Include any pap smears or colon screening. No

## 2025-07-08 NOTE — PROGRESS NOTES
CC:   Chief Complaint   Patient presents with    Follow-up     Right breast         Assessment:    ICD-10-CM    1. Malignant neoplasm of right breast in female, estrogen receptor positive, unspecified site of breast (HCC)  C50.911     Z17.0       2. Family history of breast cancer in mother  Z80.3       3. Lymphedema of breast  I89.0           Plan: Her bilateral screening mammogram was performed on 7/2/2025 and has not yet been resulted.  The reading radiologist would like some additional images before reading the results. This will be performed immediately following today's visit.  She should continue to follow routinely with Dr. Espitia for oncology.  She should perform her exercises and massages daily for her lymphedema.  I offered her a referral for the [facility] for lymphedema management, but she is not interested in it at this time. I encouraged her to perform monthly self breast exams and notify our office of any changes her breast including lumps, overlying skin changes, nipple discharge, or new pain.  I have provided her with a prescription for bras. I would like her to follow up with me or Dr. Alves in 6 months or sooner with questions or concerns.    HPI:  Candy Barrett is a 69 y.o. female with right breast cancer invasive ductal, grade 2, ER/WI positive, HER 2 estephanie negative. She underwent right breast localized lumpectomy, right axillary sentinel lymph node biopsy with negative margins and negative sentinel lymph node on 9/14/2023. She completed adjuvant radiation therapy with Dr. Mcclellan on 11/21/2023.  She continues to follow with Dr. Espitia for oncology.  She experiences some occasional heaviness in her right breast since surgery. She denies changes to her breast including lumps, overlying skin changes, nipple discharge, or new pain.  She denies fevers or chills.    Allergies:  Allergies   Allergen Reactions    Vistaril [Hydroxyzine]     Antihistamines, Diphenhydramine-Type Rash    Diphenhydramine

## 2025-08-06 RX ORDER — PRAVASTATIN SODIUM 20 MG
20 TABLET ORAL DAILY
Qty: 90 TABLET | Refills: 3 | Status: SHIPPED | OUTPATIENT
Start: 2025-08-06

## 2025-08-21 ENCOUNTER — HOSPITAL ENCOUNTER (OUTPATIENT)
Facility: HOSPITAL | Age: 70
Setting detail: RECURRING SERIES
Discharge: HOME | End: 2025-08-24
Attending: RADIOLOGY
Payer: MEDICARE

## 2025-08-21 PROCEDURE — 99214 OFFICE O/P EST MOD 30 MIN: CPT

## 2025-08-28 RX ORDER — LOSARTAN POTASSIUM 25 MG/1
25 TABLET ORAL DAILY
Qty: 90 TABLET | Refills: 3 | Status: SHIPPED | OUTPATIENT
Start: 2025-08-28 | End: 2025-08-29

## 2025-08-29 ENCOUNTER — OFFICE VISIT (OUTPATIENT)
Dept: CARDIOTHORACIC SURGERY | Age: 70
End: 2025-08-29
Payer: MEDICARE

## 2025-08-29 VITALS
HEART RATE: 96 BPM | BODY MASS INDEX: 27.78 KG/M2 | WEIGHT: 156.8 LBS | OXYGEN SATURATION: 99 % | DIASTOLIC BLOOD PRESSURE: 62 MMHG | SYSTOLIC BLOOD PRESSURE: 131 MMHG | HEIGHT: 63 IN

## 2025-08-29 DIAGNOSIS — I10 ESSENTIAL HYPERTENSION: ICD-10-CM

## 2025-08-29 DIAGNOSIS — R21 RASH: ICD-10-CM

## 2025-08-29 DIAGNOSIS — I50.32 CHRONIC HEART FAILURE WITH PRESERVED EJECTION FRACTION (HCC): ICD-10-CM

## 2025-08-29 DIAGNOSIS — E78.2 MIXED HYPERLIPIDEMIA: Primary | ICD-10-CM

## 2025-08-29 DIAGNOSIS — R06.02 SHORTNESS OF BREATH: ICD-10-CM

## 2025-08-29 PROCEDURE — 1123F ACP DISCUSS/DSCN MKR DOCD: CPT

## 2025-08-29 PROCEDURE — 3078F DIAST BP <80 MM HG: CPT

## 2025-08-29 PROCEDURE — 3075F SYST BP GE 130 - 139MM HG: CPT

## 2025-08-29 PROCEDURE — 99214 OFFICE O/P EST MOD 30 MIN: CPT

## 2025-08-29 RX ORDER — LOSARTAN POTASSIUM 100 MG/1
100 TABLET ORAL DAILY
Qty: 90 TABLET | Refills: 3 | Status: SHIPPED | OUTPATIENT
Start: 2025-09-29

## 2025-08-29 RX ORDER — PRAVASTATIN SODIUM 20 MG
20 TABLET ORAL NIGHTLY
Qty: 90 TABLET | Refills: 3 | Status: SHIPPED | OUTPATIENT
Start: 2025-08-29

## 2025-08-29 RX ORDER — BLOOD-GLUCOSE METER
EACH MISCELLANEOUS
COMMUNITY
Start: 2025-07-15

## 2025-08-29 RX ORDER — CHLORTHALIDONE 25 MG/1
25 TABLET ORAL DAILY
COMMUNITY
Start: 2025-08-25

## 2025-08-29 ASSESSMENT — PATIENT HEALTH QUESTIONNAIRE - PHQ9
SUM OF ALL RESPONSES TO PHQ QUESTIONS 1-9: 0
SUM OF ALL RESPONSES TO PHQ QUESTIONS 1-9: 0
2. FEELING DOWN, DEPRESSED OR HOPELESS: NOT AT ALL
SUM OF ALL RESPONSES TO PHQ QUESTIONS 1-9: 0
1. LITTLE INTEREST OR PLEASURE IN DOING THINGS: NOT AT ALL
SUM OF ALL RESPONSES TO PHQ QUESTIONS 1-9: 0

## 2025-08-29 ASSESSMENT — ENCOUNTER SYMPTOMS
ABDOMINAL PAIN: 1
CONSTIPATION: 1
SHORTNESS OF BREATH: 1

## 2025-09-05 ENCOUNTER — TRANSCRIBE ORDERS (OUTPATIENT)
Facility: HOSPITAL | Age: 70
End: 2025-09-05

## 2025-09-05 DIAGNOSIS — C50.411 MALIGNANT NEOPLASM OF UPPER-OUTER QUADRANT OF RIGHT FEMALE BREAST, UNSPECIFIED ESTROGEN RECEPTOR STATUS (HCC): ICD-10-CM

## 2025-09-05 DIAGNOSIS — Z78.0 POSTMENOPAUSAL STATE: Primary | ICD-10-CM

## (undated) DEVICE — APPLICATOR MEDICATED 26 CC SOLUTION HI LT ORNG CHLORAPREP

## (undated) DEVICE — CANNULA NSL AD TBNG L14FT STD PVC O2 CRV CONN NONFLARED NSL

## (undated) DEVICE — SYRINGE MED 50ML LUERSLIP TIP

## (undated) DEVICE — MEDI-VAC NON-CONDUCTIVE SUCTION TUBING: Brand: CARDINAL HEALTH

## (undated) DEVICE — ELECTRODE PT RET AD L9FT HI MOIST COND ADH HYDRGEL CORDED

## (undated) DEVICE — SUTURE MCRYL SZ 4-0 L27IN ABSRB UD L24MM PS-1 3/8 CIR PRIM Y935H

## (undated) DEVICE — SNARE POLYP M W27MMXL240CM OVL STIFF DISP CAPTIVATOR

## (undated) DEVICE — CANNULA ORIG TL CLR W FOAM CUSHIONS AND 14FT SUPL TB 3 CHN

## (undated) DEVICE — COVER,LIGHT HANDLE,FLX,1/PK: Brand: MEDLINE INDUSTRIES, INC.

## (undated) DEVICE — PROBE SET W/ DRP

## (undated) DEVICE — ADHESIVE SKIN CLSR 0.7ML TOP DERMBND ADV

## (undated) DEVICE — MAJOR PLASTIC-LF: Brand: MEDLINE INDUSTRIES, INC.

## (undated) DEVICE — APPLIER CLP L9.38IN M LIG TI DISP STR RNG HNDL LIGACLP

## (undated) DEVICE — SUTURE VCRL SZ 3-0 L27IN ABSRB UD L26MM SH 1/2 CIR J416H

## (undated) DEVICE — CATHETER THOR 36FR DIA10.7MM POLYVI CHL TRCR TIP STR SFT

## (undated) DEVICE — SOLUTION IRRIG 1000ML H2O STRL BLT

## (undated) DEVICE — NEEDLE 25GA X 1.5 IN ECLIPSE

## (undated) DEVICE — INTENDED FOR TISSUE SEPARATION, AND OTHER PROCEDURES THAT REQUIRE A SHARP SURGICAL BLADE TO PUNCTURE OR CUT.: Brand: BARD-PARKER ® STAINLESS STEEL BLADES

## (undated) DEVICE — GAUZE,SPONGE,4"X4",16PLY,STRL,LF,10/TRAY: Brand: MEDLINE

## (undated) DEVICE — SYRINGE MED 10ML LUERLOCK TIP W/O SFTY DISP

## (undated) DEVICE — COVER INSTRUMENT LOCALIZER

## (undated) DEVICE — SUTURE PERMA-HAND SZ 2-0 L30IN NONABSORBABLE BLK L26MM SH K833H

## (undated) DEVICE — FORCEPS BX L240CM JAW DIA2.8MM L CAP W/ NDL MIC MESH TOOTH

## (undated) DEVICE — LINER SUCT CANSTR 3000CC PLAS SFT PRE ASSEMB W/OUT TBNG W/

## (undated) DEVICE — GAMMEX® NON-LATEX PI UNDERGLOVE SIZE 6.5, STERILE POLYISOPRENE POWDER-FREE SURGICAL GLOVE: Brand: GAMMEX

## (undated) DEVICE — KIT OR TURNOVER

## (undated) DEVICE — SYRINGE 20ML LL S/C 50

## (undated) DEVICE — CATHETER SUCT TR FL TIP 14FR W/ O CTRL

## (undated) DEVICE — ENDOSCOPY PUMP TUBING/ CAP SET: Brand: ERBE

## (undated) DEVICE — SHEARS ENDOSCP L9CM CRV HARM FOCS +

## (undated) DEVICE — GOWN PLASTIC FILM THMBHKS UNIV BLUE: Brand: CARDINAL HEALTH

## (undated) DEVICE — UNDERPAD INCONT W23XL36IN STD BLU POLYPR BK FLUF SFT

## (undated) DEVICE — YANKAUER,SMOOTH HANDLE,HIGH CAPACITY: Brand: MEDLINE INDUSTRIES, INC.

## (undated) DEVICE — SYRINGE MED 25GA 3ML L5/8IN SUBQ PLAS W/ DETACH NDL SFTY

## (undated) DEVICE — BLADE ES ELASTOMERIC COAT INSUL DURABLE BEND UPTO 90DEG

## (undated) DEVICE — GLOVE SURG SZ 6 CRM LTX FREE POLYISOPRENE POLYMER BEAD ANTI